# Patient Record
Sex: FEMALE | Employment: OTHER | URBAN - METROPOLITAN AREA
[De-identification: names, ages, dates, MRNs, and addresses within clinical notes are randomized per-mention and may not be internally consistent; named-entity substitution may affect disease eponyms.]

---

## 2020-07-09 ENCOUNTER — OFFICE VISIT (OUTPATIENT)
Dept: NEUROLOGY | Age: 85
End: 2020-07-09
Payer: MEDICARE

## 2020-07-09 VITALS
BODY MASS INDEX: 26.21 KG/M2 | DIASTOLIC BLOOD PRESSURE: 74 MMHG | TEMPERATURE: 97.5 F | SYSTOLIC BLOOD PRESSURE: 196 MMHG | HEIGHT: 59 IN | HEART RATE: 72 BPM | WEIGHT: 130 LBS | OXYGEN SATURATION: 93 %

## 2020-07-09 PROCEDURE — 1123F ACP DISCUSS/DSCN MKR DOCD: CPT | Performed by: PHYSICIAN ASSISTANT

## 2020-07-09 PROCEDURE — 4040F PNEUMOC VAC/ADMIN/RCVD: CPT | Performed by: PHYSICIAN ASSISTANT

## 2020-07-09 PROCEDURE — 4004F PT TOBACCO SCREEN RCVD TLK: CPT | Performed by: PHYSICIAN ASSISTANT

## 2020-07-09 PROCEDURE — G8427 DOCREV CUR MEDS BY ELIG CLIN: HCPCS | Performed by: PHYSICIAN ASSISTANT

## 2020-07-09 PROCEDURE — G8417 CALC BMI ABV UP PARAM F/U: HCPCS | Performed by: PHYSICIAN ASSISTANT

## 2020-07-09 PROCEDURE — 99204 OFFICE O/P NEW MOD 45 MIN: CPT | Performed by: PHYSICIAN ASSISTANT

## 2020-07-09 PROCEDURE — 1090F PRES/ABSN URINE INCON ASSESS: CPT | Performed by: PHYSICIAN ASSISTANT

## 2020-07-09 RX ORDER — ASPIRIN 81 MG/1
TABLET ORAL
COMMUNITY
Start: 2016-08-25

## 2020-07-09 RX ORDER — AMLODIPINE BESYLATE 5 MG/1
TABLET ORAL
COMMUNITY

## 2020-07-09 RX ORDER — MIRTAZAPINE 15 MG/1
15 TABLET, FILM COATED ORAL NIGHTLY
COMMUNITY
End: 2020-07-09 | Stop reason: ALTCHOICE

## 2020-07-09 RX ORDER — BACLOFEN 5 MG/1
10 TABLET ORAL 3 TIMES DAILY PRN
Qty: 90 TABLET | Refills: 0 | Status: SHIPPED
Start: 2020-07-09 | End: 2020-07-21 | Stop reason: SINTOL

## 2020-07-09 RX ORDER — AMLODIPINE BESYLATE 5 MG/1
5 TABLET ORAL DAILY
COMMUNITY
Start: 2018-05-19

## 2020-07-09 RX ORDER — TIZANIDINE 2 MG/1
2 TABLET ORAL 3 TIMES DAILY PRN
COMMUNITY
End: 2021-02-08

## 2020-07-09 RX ORDER — LORAZEPAM 0.5 MG/1
TABLET ORAL
COMMUNITY

## 2020-07-09 RX ORDER — DOXEPIN HYDROCHLORIDE 10 MG/1
10 CAPSULE ORAL NIGHTLY
Qty: 30 CAPSULE | Refills: 2 | Status: SHIPPED
Start: 2020-07-09 | End: 2020-07-21 | Stop reason: SINTOL

## 2020-07-09 RX ORDER — HYDROCHLOROTHIAZIDE 12.5 MG/1
12.5 TABLET ORAL DAILY PRN
COMMUNITY
Start: 2018-03-21

## 2020-07-09 RX ORDER — INSULIN GLARGINE 100 [IU]/ML
INJECTION, SOLUTION SUBCUTANEOUS
COMMUNITY
Start: 2015-08-04

## 2020-07-09 RX ORDER — FLUTICASONE PROPIONATE 50 MCG
SPRAY, SUSPENSION (ML) NASAL
COMMUNITY
Start: 2014-01-13

## 2020-07-09 RX ORDER — INSULIN GLARGINE 100 [IU]/ML
30 INJECTION, SOLUTION SUBCUTANEOUS DAILY
COMMUNITY
Start: 2018-03-22

## 2020-07-09 RX ORDER — GLIPIZIDE 10 MG/1
TABLET, FILM COATED, EXTENDED RELEASE ORAL
COMMUNITY
Start: 2015-08-04

## 2020-07-09 SDOH — HEALTH STABILITY: MENTAL HEALTH: HOW OFTEN DO YOU HAVE A DRINK CONTAINING ALCOHOL?: NEVER

## 2020-07-09 NOTE — PROGRESS NOTES
[address] [facility]. Reyes Torres M.D., F.A.C.P. Wilmer Cortez, DNP, APRN, ACNS-BC  William Sierra. Jordan Simon, MSN, APRN-FNP-C  YAMILKA Carrero, PAZoeyC  Rommel Mcdonald, MSN, APRN-FNP-C  [address], [facility]  [place], [address]  Phone: [phone]  Fax: [phone]       Rudi Neff is a 80 y.o. 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 Allergies:       Patient has no known allergies. Seasonal     Medications:     Prior to Admission medications    Medication Sig Start Date End Date Taking?  Authorizing Provider   amLODIPine (NORVASC) 5 MG tablet    Yes Historical Provider, MD   aspirin (ASPIRIN 81) 81 MG EC tablet  8/25/16  Yes Historical Provider, MD   Al Hyd-Mg Tr-Alg Ac-Sod Bicarb (GAVISCON-2 PO)    Yes Historical Provider, MD   LORazepam (ATIVAN) 0.5 MG tablet    Yes Historical Provider, MD   amLODIPine (NORVASC) 5 MG tablet Take 5 mg by mouth daily 5/19/18  Yes Historical Provider, MD   fluticasone (FLONASE) 50 MCG/ACT nasal spray  1/13/14  Yes Historical Provider, MD   glipiZIDE (GLUCOTROL XL) 10 MG extended release tablet As directed 8/4/15  Yes Historical Provider, MD   hydrochlorothiazide (HYDRODIURIL) 12.5 MG tablet Take 12.5 mg by mouth daily as needed 3/21/18  Yes Historical Provider, MD   insulin glargine (LANTUS SOLOSTAR) 100 UNIT/ML injection pen Inject 30 Units into the skin daily 3/22/18  Yes Historical Provider, MD   insulin glargine (LANTUS) 100 UNIT/ML injection vial  8/4/15  Yes Historical Provider, MD   tiZANidine (ZANAFLEX) 2 MG tablet Take 2 mg by mouth 3 times daily as needed   Yes Historical Provider, MD       Social History:        reports that she has never smoked. She does not have any smokeless tobacco history on file. She reports that she does not drink alcohol or use drugs. She sleeps 8 hours per night-- but wakes up every two hours due to GERD    She is under increased stress due to her headaches and medical concerns     She drinks 1 cup of coffee per day    He does not drink enough water. She only drinks 2 to 3 cups/day. She tries to eat balanced meals. Review of Systems:     No chest pain or palpitations  No SOB  No vertigo, lightheadedness or loss of consciousness  No falls, tripping or stumbling  No incontinence of bowels or bladder  No itching or bruising appreciated  No numbness, tingling or focal arm/leg weakness  + Headache    ROS is otherwise negative    Family History:     Family history significant for diabetes and cancer. There is no family history of headaches. No family history of heart attack or stroke. History of Present Illness: The patient presents for further evaluation and management of headaches. She presents with her son. She is a fair historian. He is a good one. She was seen in the ER 3 weeks ago due to headache. CT scan of the head was unrevealing. The patient's headache problems began 2 years ago after a fall at [facility]. She does not have a history of headaches prior to 2 years ago. She hit her head during the fall. She was evaluated after that and work-up was unrevealing. Since that time she started developing headaches. Over the last 2 years they have gradually worsened in frequency and severity. Currently they are occurring almost daily. They are described as a pressure, tight squeezing pain with occasional photophobia and blurred vision. She denied any sound sensitivity, nausea or vomiting. She takes Tylenol for headaches which does not help at all. She takes this approximately 4 times per week. She cannot take ibuprofen due to her other medical issues.   Her headaches do not wake her up from sleep. Increased stress and poor sleep seem to worsen her headaches whereas laying down and resting improves them. Her headaches began approximately 30 minutes after waking up. They are worse in the afternoon. Severe headaches with an 8 out of 10 severity occur 4-5 times per week and other times she has a dull squeezing ache at all times. She has never been on a preventative medication. She does admit to neck pain that radiates up her head. She was prescribed Zanaflex and only took this on 1 occasion. She does believe it improved her head pains, however due to increased fatigue with this medication she did not take it again. She has a history of depression and takes mirtazapine at night since her daughter passed away. She has also been suffering with sinus pressure and pain and diagnosed with allergic rhinitis. She has an appointment with the [address] [facility] on the 23rd. She recently saw ophthalmology for macular edema secondary to diabetes and retinopathy. Objective:       BP (!) 196/74   Pulse 72   Temp 97.5 °F (36.4 °C)   Ht 4' 11\" (1.499 m)   Wt 130 lb (59 kg)   SpO2 93%   BMI 26.26 kg/m²     General appearance: alert, appears stated age, cooperative and in no distress  Head: normocephalic, without obvious abnormality, atraumatic--bilateral occipital groove tenderness to palpation, no temporal induration or TMJ dysfunction  Eyes: conjunctivae/corneas clear; no drainage  Neck: Limited range of motion   Lungs: clear to auscultation bilaterally  Heart: regular rate and rhythm, S1, S2 normal, no murmur  Extremities: normal, atraumatic, no cyanosis or edema  Pulses: 2+ and symmetric  Skin:  color, texture, turgor normal--no rashes or lesions      Mental Status: alert and oriented.   Thought content appropriate    Appropriate attention/concentration  Intact fundus of knowledge  Repetition intact  Memories intact    Speech: no dysarthria  Language: no aphasias    Cranial Nerves:  I: smell    II: visual acuity     II: visual fields Full to confrontation   II: pupils [place]   III,VII: ptosis None   III,IV,VI: extraocular muscles  EOMI without nystagmus   V: mastication Normal   V: facial light touch sensation  Normal   V,VII: corneal reflex     VII: facial muscle function - upper  Normal   VII: facial muscle function - lower Normal   VIII: hearing Normal   IX: soft palate elevation  Normal   IX,X: gag reflex    XI: trapezius strength  5/5   XI: sternocleidomastoid strength 5/5   XI: neck extension strength  5/5   XII: tongue strength  Normal     Motor:  5/5 throughout  Normal bulk and tone  No drift   No abnormal movements    Sensory:  LT  normal  Vibration markedly decreased at the ankles bilaterally    Coordination:   FN, FFM and BILLY normal  HS normal    Gait:  Normal    DTR:   +2 throughout    No Babinskis  No Mohan's    Laboratory/Radiology:  ry/Radiology:     Recent labs unremarkable for decreased hemoglobin and hematocrit  CMP reveals elevated creatinine at 1.37 and GFR 35    CT head report reviewed from [facility]-unrevealing    All labs and images were personally reviewed at the time of this visit    Assessment:     Chronic TTH -- likely precipitated by fall and minor head injury two years ago    No relief with Tylenol. She is unable to take NSAIDs. Worsened with increased stress levels and poor sleep   Has never been on prophylactic medication. Due to her age amitriptyline is not a good option due to the anticholinergic side effects. Inderal is also unable to be trialed due to her diabetes. Topamax is not a good option due to her kidney disease. We will try doxepin 10 mg at night. She is on mirtazapine 7.5 mg nightly for her depression. We will discontinue this medication and replace it with doxepin as this will likely have effects for both her depression and headache prevention. She previously responded to Zanaflex however due to sedation she did not continue this.   We will try baclofen 5 mg 3 times daily as needed. Hopefully this will be less sedating than Zanaflex. Occipital neuralgia bilateral    Referral to Dr. Melida Lion for occipital nerve block     HTN--patient's blood pressure was 789 systolic, 408 systolic on other arm and 203/84 on repeat at the end of exam.  I advised the patient that she either needs to call her PCP or go to the ER to have this lowered as she is at increased risk for heart attack and stroke with sustained blood pressure this high. She and her son expressed understanding. Plan:     Doxepin 10 mg at bedtime for preventative therapy    Discontinue mirtazapine    Discontinue Zanaflex    Start baclofen 5 mg 3 times daily as needed    Referral Dr. Melida Lion for possible occipital nerve blocks    We will check ESR and CRP due to patient's age and new headaches    Improved sleep hygiene and stress reduction techniques encouraged    Return to office in 3 months    Call with any questions or concerns    Nicole Ward PA-C  3:15 PM  7/9/2020    I spent 55 minutes with this patient obtaining the HPI and discussing the exam with greater than 50% of the time providing counseling and education on medications and other treatment plans. All questions were answered prior to leaving my office.

## 2020-07-09 NOTE — PATIENT INSTRUCTIONS
Doxepin 10 mg at bedtime (headache prevention) -- discontinue the mirtazepine the day prior to starting Doxepin     Baclofen (muscle relaxer) 5 mg three times daily as needed (to help with the headache pain and tension)     Do not start both of the new medicines on the same day-- wait a couple days in between

## 2020-07-20 ENCOUNTER — TELEPHONE (OUTPATIENT)
Dept: NEUROLOGY | Age: 85
End: 2020-07-20

## 2020-07-20 NOTE — TELEPHONE ENCOUNTER
Patient called in stating that the doxepin made her very sick, dry mouth & sick to stomach and the Baclofen made her feel \"funny\". She would like to speak with you.   Please Advise  Electronically signed by Kunal Abdul on 7/20/20 at 11:30 AM EDT

## 2020-07-21 NOTE — TELEPHONE ENCOUNTER
Called and spoke to patient. I advised her to discontinue both the doxepin and baclofen. She can resume taking Remeron (she was previously on this to help her sleep and for depression). She has an appointment with her allergist on Thursday. I advised her to keep this appointment as well as follow up with Dr. Kelley Screen for possible occipital nerve blocks. Unfortunately,due to her co-morbid conditions-- treatment options are limited for her headache  She is unable to take NSAIDs due to stomach ulcer reported by patient   Tylenol was ineffective. Zanaflex and baclofen were trailed-- but side effects outweighed benefits     In regards to prophylactic therapy-- Topamax is not a good option due to hx of renal calculi, Elavil unable due to SE, Doxepin also had side effects. Inderal is not a good option due to her diabetes. Potentially gabapentin could be trialed, however she seems to be very sensitive to medication side effects. She will do the above first and then call back if she still is having issues.      All questions answered at this time

## 2020-08-24 ENCOUNTER — TELEPHONE (OUTPATIENT)
Dept: NEUROLOGY | Age: 85
End: 2020-08-24

## 2020-08-24 NOTE — TELEPHONE ENCOUNTER
Patient called in requesting call from SABA Roque states having extreme pressure behind eyes, top of head has been going on for awhile but today is really bad. MA did advise if gets worse to go to ED.   Electronically signed by Tiffani Bone on 8/24/20 at 10:15 AM EDT

## 2020-08-24 NOTE — TELEPHONE ENCOUNTER
Patient's BP is 177/83 again MA instructed patient to ED per [facility] advisement.  She said ok and hung up

## 2020-08-24 NOTE — TELEPHONE ENCOUNTER
Called patient instructed her to ED, she states that her BP has been high before  Will retake and call back

## 2020-08-24 NOTE — TELEPHONE ENCOUNTER
Please instruct the patient to go to the ED-- her elevated BP may be causing her symptoms. Thank you.

## 2020-08-24 NOTE — TELEPHONE ENCOUNTER
I called and spoke to patient--Her BP at the office at last visit was > 637 systolic. I instructed her to take her BP at home and call back with the results due to her increased headache, eye pain and pressure. If SBP > 180-- please advise patient to go to ER for further management     If BP is below, I will call patient back with further recommendations for treatment. Thank you.

## 2020-08-27 ENCOUNTER — TELEPHONE (OUTPATIENT)
Dept: NEUROLOGY | Age: 85
End: 2020-08-27

## 2020-08-27 NOTE — TELEPHONE ENCOUNTER
Called and spoke with patient. She did not go to the ED as instructed on Monday when she called. She did f/u with her PCP and he added HCTZ for her BP. She has an appointment with Dr. Sandra Cohen on 9/10 for possible occipital nerve blocks. Again-- with her age and medical co-morbidities, treatment options are limited for her headaches.      Preventatives   Amitriptyline- not tried due to age and anticholinergic side effects   Doxepin- Tried and SE not tolerated   Topamax- not tried due to hx of kidney stones   Inderal- not tried due to hx of diabetes-- can mask signs of hypoglycemia     Abortive   Unable to take NSAIDs per patient  Triptans unable due to hx of CAD  Has tried Zanaflex and Baclofen, but had intolerable side effects  Unable to do steroid burst due to diabetes      Will send over verapamil 120 mg SR daily for preventative therapy  Consider trying CGRP abortive medication   Encouraged regular home BP checks   Encouraged her to keep f/u with Dr. Sandra Cohen on 9/10     She will call at any time if problems arise

## 2020-08-27 NOTE — TELEPHONE ENCOUNTER
Patient called in requesting a call back from Giles De La Garza about her headaches. Informed her that Kelli Ibarra is working in the hospital this week and not in the office. She stated that was ok, that Kelli Ibarra still should call her. She would not give me anymore information and requested again the Kelli Gomezr call her.

## 2020-11-23 ENCOUNTER — TELEPHONE (OUTPATIENT)
Dept: NEUROLOGY | Age: 85
End: 2020-11-23

## 2020-11-23 NOTE — TELEPHONE ENCOUNTER
Patient called in today. She canceled her appt on 10/29/20. She states that she is in pain. MA gave he an appt for 12/16/20. She was advised if gets worse that she should go to ER Dept or call PCP. Please advise.   Electronically signed by Elyn Seip, MA on 11/23/20 at 2:48 PM EST

## 2020-11-30 NOTE — TELEPHONE ENCOUNTER
Patient did not go to Dr Amita Cevallos. She states that she will wait til her appt 12/16/20 with Danny Merritt. .  Electronically signed by Tk Mueller MA on 11/30/20 at 11:01 AM EST

## 2020-11-30 NOTE — TELEPHONE ENCOUNTER
Did the patient ever go for her appointment with Dr. Akosua Goldberg for occipital nerve blocks that was scheduled in September? I can attempt to send something over for her-- but she has been very sensitive to side effects of every medication I have tried so far.

## 2020-12-16 ENCOUNTER — VIRTUAL VISIT (OUTPATIENT)
Dept: NEUROLOGY | Age: 85
End: 2020-12-16
Payer: MEDICARE

## 2020-12-16 PROCEDURE — G2012 BRIEF CHECK IN BY MD/QHP: HCPCS | Performed by: PHYSICIAN ASSISTANT

## 2020-12-16 NOTE — PROGRESS NOTES
[address] [facility]. Adryan Renae M.D., F.A.C.P. Abigail Butts, DNP, APRN, CNS  Dali Khan. Yen Stewart, MSN, APRN-FNP-C  Enoch Cartagena MSN, APRN, FNP-C  Dolly PRATHER, PA-SANDER  [address] MSN, APRN, FNP-C  [address], [facility]  [place], [address] [address] [place]  Phone: [phone]  Fax: [phone]             Odessa Red is a 80 y.o. female evaluated via telephone on 12/16/2020. The patient and/or health care decision maker is aware that that he/she may receive a bill for this telephone service, depending on his/her insurance coverage, and has provided verbal consent to proceed: Yes    I affirm this is a Patient Initiated Episode with an Established Patient who has not had a related appointment within my department in the past 7 days or scheduled within the next 24 hours. The patient's identity was verified at the start of the call. Others involved in call: Total Time: minutes: 11-20 minutes    Note: not billable if this call serves to triage the patient into an appointment for the relevant concern    HPI:     Patient follows for headaches. Patient initially seen in July 2020 and had reported she has been suffering with headaches for 2 years after she had suffered a fall and head injury. They have gradually worsened in frequency and severity. They are occurring almost daily  She describes as a heaviness and pressure sensation behind the eyes and across the forehead. Various prophylactic medications have been trialed but discontinued after couple days of use due to side effects. Additionally due to her age and intolerance to medications several were unable to be trialed as well. On her previous exam it was noted that she was tender over the bilateral occipital groove suggesting bilateral occipital neuralgia. She was referred for occipital nerve blocks with Dr. Jw Keating in September however did not go to this appointment. She also reports having fibromyalgia and was started on Lyrica by her PCP. She is unable to take this medication twice daily as prescribed due to the side effects. Once a day is not helping with her pains. She admits to significant depression due to the above. No chest pain or palpitations  No SOB  No vertigo, lightheadedness or loss of consciousness  No falls, tripping or stumbling  No incontinence of bowels or bladder  No itching or bruising   No numbness, tingling or focal arm/leg weakness  + Headache    ROS otherwise negative      Medications:     Prior to Admission medications    Medication Sig Start Date End Date Taking?  Authorizing Provider   verapamil (CALAN SR) 120 MG extended release tablet Take 1 tablet by mouth daily 8/27/20  Yes SABA Packer   amLODIPine (NORVASC) 5 MG tablet    Yes Historical Provider, MD   aspirin (ASPIRIN 81) 81 MG EC tablet  8/25/16  Yes Historical Provider, MD   Al Hyd-Mg Tr-Alg Ac-Sod Bicarb (GAVISCON-2 PO)    Yes Historical Provider, MD   LORazepam (ATIVAN) 0.5 MG tablet    Yes Historical Provider, MD   amLODIPine (NORVASC) 5 MG tablet Take 5 mg by mouth daily 5/19/18  Yes Historical Provider, MD   fluticasone (FLONASE) 50 MCG/ACT nasal spray  1/13/14  Yes Historical Provider, MD   glipiZIDE (GLUCOTROL XL) 10 MG extended release tablet As directed 8/4/15  Yes Historical Provider, MD   hydrochlorothiazide (HYDRODIURIL) 12.5 MG tablet Take 12.5 mg by mouth daily as needed 3/21/18  Yes Historical Provider, MD   insulin glargine (LANTUS SOLOSTAR) 100 UNIT/ML injection pen Inject 30 Units into the skin daily 3/22/18  Yes Historical Provider, MD   insulin glargine (LANTUS) 100 UNIT/ML injection vial  8/4/15  Yes Historical Provider, MD   tiZANidine (ZANAFLEX) 2 MG tablet Take 2 mg by mouth 3 times daily as needed    Historical Provider, MD       Objective:     Mental Status Exam: sounds alert, oriented x4; thought processes appropriate    Speech sounds clear Breathing Effort sounds normal    Laboratory/Radiology: There are no current labs or images for me to review at today's visit     All labs and images personally reviewed today    Assessment:     Chronic TTH -- likely precipitated by fall and minor head injury two years ago               No relief with Tylenol. She is unable to take NSAIDs. No Triptans due to CAD              Worsened with increased stress levels and poor sleep              Due to her age amitriptyline is not a good option due to the anticholinergic side effects. Inderal is also unable to be trialed due to her diabetes as this can mask signs of hypoglycemia. Topamax is not a good option due to her kidney disease. Also failed Doxepin and verapamil due to side effects.                Additionally, she has tried and failed Zanaflex and baclofen due to side effects    Unable to do steroid burst due to her diabetes   Unfortunately, she is very sensitive to medication side effects and has been unable to tolerate the above list.    She may benefit from seeing a headache specialist                 Occipital neuralgia bilateral               Referral to Dr. Jw Keating for occipital nerve block       Plan:     Referral back to Dr. Jw Keating for possible occipital nerve blocks     Consider referral to [facility] headache clinic     She has not completed blood work I had ordered including ESR and CRP     RTO after above     Call with questions or concerns       Myah Ordaz PA-C  12:45 PM  12/16/2020

## 2020-12-16 NOTE — PROGRESS NOTES
Arnol Graham was read the following message We want to confirm that, for purposes of billing, this is a virtual visit with your provider for which we will submit a claim for reimbursement with your insurance company. You will be responsible for any copays, coinsurance amounts or other amounts not covered by your insurance company. If you do not accept this, unfortunately we will not be able to schedule or proceed with a virtual visit with the provider. Do you accept? Chloe Beltran responded Yes .

## 2021-02-08 ENCOUNTER — OFFICE VISIT (OUTPATIENT)
Dept: NEUROLOGY | Age: 86
End: 2021-02-08
Payer: MEDICARE

## 2021-02-08 VITALS
TEMPERATURE: 98.3 F | HEART RATE: 69 BPM | RESPIRATION RATE: 16 BRPM | DIASTOLIC BLOOD PRESSURE: 92 MMHG | SYSTOLIC BLOOD PRESSURE: 202 MMHG | WEIGHT: 130 LBS | OXYGEN SATURATION: 97 % | HEIGHT: 57 IN | BODY MASS INDEX: 28.05 KG/M2

## 2021-02-08 DIAGNOSIS — R51.9 CHRONIC DAILY HEADACHE: Primary | ICD-10-CM

## 2021-02-08 DIAGNOSIS — M54.2 CERVICALGIA: ICD-10-CM

## 2021-02-08 PROCEDURE — 1123F ACP DISCUSS/DSCN MKR DOCD: CPT | Performed by: PSYCHIATRY & NEUROLOGY

## 2021-02-08 PROCEDURE — G8417 CALC BMI ABV UP PARAM F/U: HCPCS | Performed by: PSYCHIATRY & NEUROLOGY

## 2021-02-08 PROCEDURE — 1090F PRES/ABSN URINE INCON ASSESS: CPT | Performed by: PSYCHIATRY & NEUROLOGY

## 2021-02-08 PROCEDURE — G8427 DOCREV CUR MEDS BY ELIG CLIN: HCPCS | Performed by: PSYCHIATRY & NEUROLOGY

## 2021-02-08 PROCEDURE — 4040F PNEUMOC VAC/ADMIN/RCVD: CPT | Performed by: PSYCHIATRY & NEUROLOGY

## 2021-02-08 PROCEDURE — 1036F TOBACCO NON-USER: CPT | Performed by: PSYCHIATRY & NEUROLOGY

## 2021-02-08 PROCEDURE — 99204 OFFICE O/P NEW MOD 45 MIN: CPT | Performed by: PSYCHIATRY & NEUROLOGY

## 2021-02-08 PROCEDURE — G8484 FLU IMMUNIZE NO ADMIN: HCPCS | Performed by: PSYCHIATRY & NEUROLOGY

## 2021-02-08 RX ORDER — ACETAMINOPHEN, ASPIRIN AND CAFFEINE 250; 250; 65 MG/1; MG/1; MG/1
1 TABLET, FILM COATED ORAL EVERY 6 HOURS PRN
COMMUNITY

## 2021-02-08 RX ORDER — TRAMADOL HYDROCHLORIDE 50 MG/1
50 TABLET ORAL EVERY 6 HOURS PRN
COMMUNITY

## 2021-02-08 RX ORDER — NORTRIPTYLINE HYDROCHLORIDE 10 MG/1
10 CAPSULE ORAL NIGHTLY
Qty: 30 CAPSULE | Refills: 0 | Status: SHIPPED | OUTPATIENT
Start: 2021-02-08

## 2021-02-08 RX ORDER — PREGABALIN 25 MG/1
25 CAPSULE ORAL 2 TIMES DAILY
COMMUNITY

## 2021-02-08 ASSESSMENT — ENCOUNTER SYMPTOMS
VOMITING: 0
NAUSEA: 0
TROUBLE SWALLOWING: 0
PHOTOPHOBIA: 0
SHORTNESS OF BREATH: 0

## 2021-02-08 NOTE — PROGRESS NOTES
NEUROLOGY NEW PATIENT NOTE     Date: 2/8/2021  Name: Diamante Larsen  MRN: <[id number]>  Patient's PCP: Kristi Montana DO     Dear, Dr. Kristi Montana DO    REASON FOR VISIT/CHIEF COMPLAINT: Headache     HISTORY OF PRESENT ILLNESS: Diamante Larsen is a 80 y.o.  female is coming for evaluation of headache    I have personally reviewed her medical records      PAST MEDICAL HISTORY:   Past Medical History:   Diagnosis Date    Diabetes ([address]     Hypertension      PAST SURGICAL HISTORY:   Past Surgical History:   Procedure Laterality Date    HYSTERECTOMY       FAMILY MEDICAL HISTORY: History reviewed. No pertinent family history. SOCIAL HISTORY:   Social History     Socioeconomic History    Marital status:       Spouse name: None    Number of children: None    Years of education: None    Highest education level: None   Occupational History    None   Social Needs    Financial resource strain: None    Food insecurity     Worry: None     Inability: None    Transportation needs     Medical: None     Non-medical: None   Tobacco Use    Smoking status: Never Smoker    Smokeless tobacco: Never Used   Substance and Sexual Activity    Alcohol use: Never     Frequency: Never    Drug use: Never    Sexual activity: None   Lifestyle    Physical activity     Days per week: None     Minutes per session: None    Stress: None   Relationships    Social connections     Talks on phone: None     Gets together: None     Attends Anglican service: None     Active member of club or organization: None     Attends meetings of clubs or organizations: None     Relationship status: None    Intimate partner violence     Fear of current or ex partner: None     Emotionally abused: None     Physically abused: None     Forced sexual activity: None   Other Topics Concern    None   Social History Narrative    None      E-Cigarettes/Vaping Use     Questions Responses    E-Cigarette/Vaping Use Never User    Start Date Passive Exposure     Quit Date     Counseling Given     Comments          Allergy: No Known Allergies  MEDS:   Current Outpatient Medications:     insulin lispro (HUMALOG) 100 UNIT/ML injection vial, Inject into the skin 3 times daily (before meals), Disp: , Rfl:     pregabalin (LYRICA) 25 MG capsule, Take 25 mg by mouth 2 times daily. , Disp: , Rfl:     aspirin-acetaminophen-caffeine (EXCEDRIN MIGRAINE) 250-250-65 MG per tablet, Take 1 tablet by mouth every 6 hours as needed for Headaches, Disp: , Rfl:     traMADol (ULTRAM) 50 MG tablet, Take 50 mg by mouth every 6 hours as needed for Pain., Disp: , Rfl:     nortriptyline (PAMELOR) 10 MG capsule, Take 1 capsule by mouth nightly, Disp: 30 capsule, Rfl: 0    amLODIPine (NORVASC) 5 MG tablet, , Disp: , Rfl:     aspirin (ASPIRIN 81) 81 MG EC tablet, , Disp: , Rfl:     Al Hyd-Mg Tr-Alg Ac-Sod Bicarb (GAVISCON-2 PO), , Disp: , Rfl:     LORazepam (ATIVAN) 0.5 MG tablet, , Disp: , Rfl:     amLODIPine (NORVASC) 5 MG tablet, Take 5 mg by mouth daily, Disp: , Rfl:     fluticasone (FLONASE) 50 MCG/ACT nasal spray, , Disp: , Rfl:     glipiZIDE (GLUCOTROL XL) 10 MG extended release tablet, As directed, Disp: , Rfl:     hydrochlorothiazide (HYDRODIURIL) 12.5 MG tablet, Take 12.5 mg by mouth daily as needed, Disp: , Rfl:     insulin glargine (LANTUS SOLOSTAR) 100 UNIT/ML injection pen, Inject 30 Units into the skin daily, Disp: , Rfl:     insulin glargine (LANTUS) 100 UNIT/ML injection vial, , Disp: , Rfl:     REVIEW OF SYSTEMS  Review of Systems   Constitutional: Negative for appetite change, fatigue and unexpected weight change. HENT: Negative for drooling, hearing loss, tinnitus and trouble swallowing. Eyes: Negative for photophobia and visual disturbance. Respiratory: Negative for shortness of breath. Cardiovascular: Negative for palpitations. Gastrointestinal: Negative for nausea and vomiting. Endocrine: Negative for polyuria.    Genitourinary: Negative for flank pain. Musculoskeletal: Negative for neck pain and neck stiffness. Skin: Negative for rash. Allergic/Immunologic: Negative for food allergies. Neurological: Positive for headaches. Negative for dizziness, tremors, seizures, syncope, speech difficulty, weakness, light-headedness and numbness. Hematological: Negative for adenopathy. Psychiatric/Behavioral: Negative for agitation, behavioral problems and sleep disturbance. PHYSICAL EXAM:   BP (!) 202/92 Comment: dr Watson Bal notified  Pulse 69   Temp 98.3 °F (36.8 °C) (Temporal)   Resp 16   Ht 4' 9\" (1.448 m)   Wt 130 lb (59 kg)   SpO2 97%   BMI 28.13 kg/m²      Neurological examination     MENTAL STATUS: Patient awake and oriented to time, place, and person. Recent/remote memory normal. Attention span/concentration normal. Speech fluent. Good comprehension, naming, and repetition. Fund of knowledge appropriate for patient's level of education. Affect normal.    CRANIAL NERVES:  CN I: Not tested. CN II: Fundoscopic exam not performed. CN III, IV, VI: Pupils equal, round and reactive to light; extra ocular movements full and intact. CN V: Facial sensation normal.  CN VII: No facial asymmetry. CN VIII:  Hearing grossly normal bilaterally. No pathologic nystagmus or skew deviation. CN IX, X: Palate elevates symmetrically. CN XI: Shoulder shrug and chin rotation equal with intact strength. CN XII: Tongue protrusion midline. MOTOR: Normal bulk. Tone normal and symmetric throughout. Strength 5/5 throughout. ABNORMAL MOVEMENTS/TREMORS: No     REFLEXES: DTRs 2+; normal and symmetric throughout. Plantar response downgoing. SENSATION: Sensation grossly intact to fine touch, pain/temperature, vibration and position. COORDINATION: Finger-to-nose and heel to shin normal for age and symmetric. Finger tapping and alternating movements normal.    STATION: Negative Romberg. GAIT:  Normal heel, toe and tandem; no ataxia. Tenderness to palpation bilateral occipital region    Multiple trigger points in the neck and back    DIAGNOSTIC TESTS:     I have personally reviewed the most recent lab results:    No results found for: NA, K, CL, CO2, BUN, CREATININE, LABGLOM, CALCIUM, PHOS, MG  No results found for: WBC, HGB, HCT, PLT, NEUTOPHILPCT, MONOPCT, EOSPCT  No results found for: ALBUMIN, PROT, BILITOT, ALKPHOS, ALT, AST  No results found for: PTT, INR  No results found for: CHOLTOT, TRIG, HDL  No components found for: HGBA1C  No results found for: PROTEINCSF, GLUCCSF, WBCCSF    Controlled Substance Monitoring:    Acute and Chronic Pain Monitoring:   No flowsheet data found. MEDICAL DECISION MAKING  ASSESSMENT/PLAN    [facility] was seen today for headache. Diagnoses and all orders for this visit:    Posttraumatic headache    Chronic daily headache    Occipital pain    Cervicalgia    Cervical myofascial pain    Fibromyalgia    -     nortriptyline (PAMELOR) 10 MG capsule; Take 1 capsule by mouth nightly  -     External Referral To Physical Therapy    · The patient is coming in for evaluation of headache. The patient is accompanied by her son who provides the history. · She had a fall about 2 years ago and since then she has been experiencing headache. She does not have any history of migraines. The patient reports that her headache started in the bilateral frontal region and then radiates all over the head. She also has worsening of her headache when she moves her head. She also has bilateral occipital pain which gets worse on palpation. · She has tried multiple medications in the past including doxepin, verapamil, doxepin give her side effects and she did not try verapamil. She also tried Zanaflex which helped her symptoms however it caused her to be sleepy and drowsy. · She has not tried beta-blockers due to risk of asthma,, diabetes she has not tried any other tricyclic antidepressants.   · At this time her symptoms are most likely secondary to underlying posttraumatic headache with combined features of tension type and migraine headaches and her underlying myofascial pain with the fibromyalgia is also playing a role in it. .  She has bilateral occipital tenderness to palpation and multiple trigger points in the neck. · At this time I have discussed the possibility of starting low-dose nortriptyline for posttraumatic headache. The patient was counseled on risk and benefits of the medication and potential side effects and the black box warning with the medication. They verbalized understanding. · A trial of occipital nerve blocks and trigger point injections can be done for symptomatic relief. Risk and benefits explained. Currently the patient would like to hold off on it. Follow-up with Chrissie Sicard    Thank you for involving me in the care of your patient. I have personally spent a total time of 47 mins. This includes face-to-face and nonface-to-face time in reviewing patient's chart, outside provider notes, imaging and  test results, discussing etiology management and diagnosis of the patient's condition, natural course of the disease, medication side effects and charting, ordering labs, imaging, medications, and coordination of care. Patient's current medication list, allergies, problem list and results of all previously ordered testing and scans were reviewed at today's visit.       Marilyn Loya MD    [facility] Neurology  [address] [place] [address]

## 2023-08-28 ENCOUNTER — APPOINTMENT (OUTPATIENT)
Dept: GENERAL RADIOLOGY | Age: 88
DRG: 064 | End: 2023-08-28
Payer: MEDICARE

## 2023-08-28 ENCOUNTER — HOSPITAL ENCOUNTER (INPATIENT)
Age: 88
LOS: 4 days | Discharge: NURSING, REHAB OR LONG-TERM CARE | DRG: 064 | End: 2023-09-02
Attending: STUDENT IN AN ORGANIZED HEALTH CARE EDUCATION/TRAINING PROGRAM | Admitting: INTERNAL MEDICINE
Payer: MEDICARE

## 2023-08-28 ENCOUNTER — APPOINTMENT (OUTPATIENT)
Dept: CT IMAGING | Age: 88
DRG: 064 | End: 2023-08-28
Payer: MEDICARE

## 2023-08-28 DIAGNOSIS — K57.32 DIVERTICULITIS OF COLON: ICD-10-CM

## 2023-08-28 DIAGNOSIS — G62.9 NEUROPATHY: ICD-10-CM

## 2023-08-28 DIAGNOSIS — I63.322 CEREBROVASCULAR ACCIDENT (CVA) DUE TO THROMBOSIS OF LEFT ANTERIOR CEREBRAL ARTERY (HCC): ICD-10-CM

## 2023-08-28 DIAGNOSIS — R41.82 ALTERED MENTAL STATUS, UNSPECIFIED ALTERED MENTAL STATUS TYPE: Primary | ICD-10-CM

## 2023-08-28 LAB
ALBUMIN SERPL-MCNC: 3.9 G/DL (ref 3.5–5.2)
ALP SERPL-CCNC: 93 U/L (ref 35–104)
ALT SERPL-CCNC: 24 U/L (ref 0–32)
AMMONIA PLAS-SCNC: 33 UMOL/L (ref 11–51)
AMPHET UR QL SCN: NEGATIVE
ANION GAP SERPL CALCULATED.3IONS-SCNC: 13 MMOL/L (ref 7–16)
APAP SERPL-MCNC: <5 UG/ML (ref 10–30)
AST SERPL-CCNC: 27 U/L (ref 0–31)
BACTERIA URNS QL MICRO: ABNORMAL
BARBITURATES UR QL SCN: NEGATIVE
BASOPHILS # BLD: 0 K/UL (ref 0–0.2)
BASOPHILS NFR BLD: 0 % (ref 0–2)
BENZODIAZ UR QL: NEGATIVE
BILIRUB DIRECT SERPL-MCNC: 0.2 MG/DL (ref 0–0.3)
BILIRUB INDIRECT SERPL-MCNC: 0.9 MG/DL (ref 0–1)
BILIRUB SERPL-MCNC: 1.1 MG/DL (ref 0–1.2)
BILIRUB UR QL STRIP: NEGATIVE
BUN SERPL-MCNC: 24 MG/DL (ref 6–23)
BUPRENORPHINE UR QL: NEGATIVE
CALCIUM SERPL-MCNC: 9 MG/DL (ref 8.6–10.2)
CANNABINOIDS UR QL SCN: NEGATIVE
CHLORIDE SERPL-SCNC: 103 MMOL/L (ref 98–107)
CHP ED QC CHECK: NORMAL
CK SERPL-CCNC: 104 U/L (ref 20–180)
CLARITY UR: CLEAR
CO2 SERPL-SCNC: 20 MMOL/L (ref 22–29)
COCAINE UR QL SCN: NEGATIVE
COLOR UR: YELLOW
CREAT SERPL-MCNC: 1.1 MG/DL (ref 0.5–1)
EOSINOPHIL # BLD: 0.09 K/UL (ref 0.05–0.5)
EOSINOPHILS RELATIVE PERCENT: 1 % (ref 0–6)
ERYTHROCYTE [DISTWIDTH] IN BLOOD BY AUTOMATED COUNT: 14.2 % (ref 11.5–15)
ETHANOLAMINE SERPL-MCNC: <10 MG/DL
FENTANYL UR QL: NEGATIVE
GFR SERPL CREATININE-BSD FRML MDRD: 49 ML/MIN/1.73M2
GLUCOSE BLD-MCNC: 227 MG/DL
GLUCOSE BLD-MCNC: 227 MG/DL (ref 74–99)
GLUCOSE SERPL-MCNC: 250 MG/DL (ref 74–99)
GLUCOSE UR STRIP-MCNC: 250 MG/DL
HCT VFR BLD AUTO: 35.3 % (ref 34–48)
HGB BLD-MCNC: 11.2 G/DL (ref 11.5–15.5)
HGB UR QL STRIP.AUTO: ABNORMAL
INR PPP: 1.1
KETONES UR STRIP-MCNC: NEGATIVE MG/DL
LACTATE BLDV-SCNC: 1.6 MMOL/L (ref 0.5–1.9)
LEUKOCYTE ESTERASE UR QL STRIP: NEGATIVE
LIPASE SERPL-CCNC: 8 U/L (ref 13–60)
LYMPHOCYTES NFR BLD: 0.43 K/UL (ref 1.5–4)
LYMPHOCYTES RELATIVE PERCENT: 5 % (ref 20–42)
MCH RBC QN AUTO: 32.8 PG (ref 26–35)
MCHC RBC AUTO-ENTMCNC: 31.7 G/DL (ref 32–34.5)
MCV RBC AUTO: 103.5 FL (ref 80–99.9)
METHADONE UR QL: NEGATIVE
MONOCYTES NFR BLD: 0.51 K/UL (ref 0.1–0.95)
MONOCYTES NFR BLD: 6 % (ref 2–12)
NEUTROPHILS NFR BLD: 88 % (ref 43–80)
NEUTS SEG NFR BLD: 7.58 K/UL (ref 1.8–7.3)
NITRITE UR QL STRIP: NEGATIVE
OPIATES UR QL SCN: NEGATIVE
OXYCODONE UR QL SCN: NEGATIVE
PCP UR QL SCN: NEGATIVE
PH UR STRIP: 5.5 [PH] (ref 5–9)
PLATELET # BLD AUTO: 196 K/UL (ref 130–450)
PMV BLD AUTO: 10.4 FL (ref 7–12)
POTASSIUM SERPL-SCNC: 4.2 MMOL/L (ref 3.5–5)
PROT SERPL-MCNC: 7.3 G/DL (ref 6.4–8.3)
PROT UR STRIP-MCNC: 100 MG/DL
PROTHROMBIN TIME: 11.6 SEC (ref 9.3–12.4)
RBC # BLD AUTO: 3.41 M/UL (ref 3.5–5.5)
RBC # BLD: ABNORMAL 10*6/UL
RBC #/AREA URNS HPF: ABNORMAL /HPF
SALICYLATES SERPL-MCNC: <0.3 MG/DL (ref 0–30)
SARS-COV-2 RDRP RESP QL NAA+PROBE: NOT DETECTED
SODIUM SERPL-SCNC: 136 MMOL/L (ref 132–146)
SP GR UR STRIP: 1.02 (ref 1–1.03)
SPECIMEN DESCRIPTION: NORMAL
TEST INFORMATION: NORMAL
TOXIC TRICYCLIC SC,BLOOD: NEGATIVE
TROPONIN I SERPL HS-MCNC: 34 NG/L (ref 0–9)
TROPONIN I SERPL HS-MCNC: 36 NG/L (ref 0–9)
TSH SERPL DL<=0.05 MIU/L-ACNC: 2.58 UIU/ML (ref 0.27–4.2)
UROBILINOGEN UR STRIP-ACNC: 0.2 EU/DL (ref 0–1)
WBC #/AREA URNS HPF: ABNORMAL /HPF
WBC OTHER # BLD: 8.6 K/UL (ref 4.5–11.5)

## 2023-08-28 PROCEDURE — 6360000004 HC RX CONTRAST MEDICATION: Performed by: RADIOLOGY

## 2023-08-28 PROCEDURE — 96374 THER/PROPH/DIAG INJ IV PUSH: CPT

## 2023-08-28 PROCEDURE — 81001 URINALYSIS AUTO W/SCOPE: CPT

## 2023-08-28 PROCEDURE — 74177 CT ABD & PELVIS W/CONTRAST: CPT

## 2023-08-28 PROCEDURE — 71045 X-RAY EXAM CHEST 1 VIEW: CPT

## 2023-08-28 PROCEDURE — 80307 DRUG TEST PRSMV CHEM ANLYZR: CPT

## 2023-08-28 PROCEDURE — 2580000003 HC RX 258: Performed by: STUDENT IN AN ORGANIZED HEALTH CARE EDUCATION/TRAINING PROGRAM

## 2023-08-28 PROCEDURE — 84443 ASSAY THYROID STIM HORMONE: CPT

## 2023-08-28 PROCEDURE — 82248 BILIRUBIN DIRECT: CPT

## 2023-08-28 PROCEDURE — G0378 HOSPITAL OBSERVATION PER HR: HCPCS

## 2023-08-28 PROCEDURE — 82140 ASSAY OF AMMONIA: CPT

## 2023-08-28 PROCEDURE — 87077 CULTURE AEROBIC IDENTIFY: CPT

## 2023-08-28 PROCEDURE — 96375 TX/PRO/DX INJ NEW DRUG ADDON: CPT

## 2023-08-28 PROCEDURE — 99285 EMERGENCY DEPT VISIT HI MDM: CPT

## 2023-08-28 PROCEDURE — 6360000002 HC RX W HCPCS: Performed by: STUDENT IN AN ORGANIZED HEALTH CARE EDUCATION/TRAINING PROGRAM

## 2023-08-28 PROCEDURE — 80143 DRUG ASSAY ACETAMINOPHEN: CPT

## 2023-08-28 PROCEDURE — 96361 HYDRATE IV INFUSION ADD-ON: CPT

## 2023-08-28 PROCEDURE — 93005 ELECTROCARDIOGRAM TRACING: CPT | Performed by: STUDENT IN AN ORGANIZED HEALTH CARE EDUCATION/TRAINING PROGRAM

## 2023-08-28 PROCEDURE — 87040 BLOOD CULTURE FOR BACTERIA: CPT

## 2023-08-28 PROCEDURE — 82550 ASSAY OF CK (CPK): CPT

## 2023-08-28 PROCEDURE — 85025 COMPLETE CBC W/AUTO DIFF WBC: CPT

## 2023-08-28 PROCEDURE — 83690 ASSAY OF LIPASE: CPT

## 2023-08-28 PROCEDURE — 84484 ASSAY OF TROPONIN QUANT: CPT

## 2023-08-28 PROCEDURE — 87635 SARS-COV-2 COVID-19 AMP PRB: CPT

## 2023-08-28 PROCEDURE — 82962 GLUCOSE BLOOD TEST: CPT

## 2023-08-28 PROCEDURE — 70450 CT HEAD/BRAIN W/O DYE: CPT

## 2023-08-28 PROCEDURE — 83605 ASSAY OF LACTIC ACID: CPT

## 2023-08-28 PROCEDURE — 85610 PROTHROMBIN TIME: CPT

## 2023-08-28 PROCEDURE — G0480 DRUG TEST DEF 1-7 CLASSES: HCPCS

## 2023-08-28 PROCEDURE — 80179 DRUG ASSAY SALICYLATE: CPT

## 2023-08-28 PROCEDURE — 80053 COMPREHEN METABOLIC PANEL: CPT

## 2023-08-28 RX ORDER — METRONIDAZOLE 500 MG/100ML
500 INJECTION, SOLUTION INTRAVENOUS ONCE
Status: DISCONTINUED | OUTPATIENT
Start: 2023-08-28 | End: 2023-08-28

## 2023-08-28 RX ORDER — SODIUM CHLORIDE 0.9 % (FLUSH) 0.9 %
5-40 SYRINGE (ML) INJECTION PRN
Status: DISCONTINUED | OUTPATIENT
Start: 2023-08-28 | End: 2023-08-29

## 2023-08-28 RX ORDER — SODIUM CHLORIDE 0.9 % (FLUSH) 0.9 %
5-40 SYRINGE (ML) INJECTION EVERY 12 HOURS SCHEDULED
Status: DISCONTINUED | OUTPATIENT
Start: 2023-08-28 | End: 2023-08-29

## 2023-08-28 RX ORDER — 0.9 % SODIUM CHLORIDE 0.9 %
1000 INTRAVENOUS SOLUTION INTRAVENOUS ONCE
Status: COMPLETED | OUTPATIENT
Start: 2023-08-28 | End: 2023-08-28

## 2023-08-28 RX ORDER — SODIUM CHLORIDE 9 MG/ML
INJECTION, SOLUTION INTRAVENOUS PRN
Status: DISCONTINUED | OUTPATIENT
Start: 2023-08-28 | End: 2023-08-29

## 2023-08-28 RX ADMIN — IOPAMIDOL 75 ML: 755 INJECTION, SOLUTION INTRAVENOUS at 17:47

## 2023-08-28 RX ADMIN — WATER 2000 MG: 1 INJECTION INTRAMUSCULAR; INTRAVENOUS; SUBCUTANEOUS at 19:43

## 2023-08-28 RX ADMIN — Medication 10 ML: at 21:01

## 2023-08-28 RX ADMIN — METRONIDAZOLE 500 MG: 500 INJECTION, SOLUTION INTRAVENOUS at 19:44

## 2023-08-28 RX ADMIN — SODIUM CHLORIDE 1000 ML: 9 INJECTION, SOLUTION INTRAVENOUS at 15:51

## 2023-08-28 NOTE — H&P
Hospitalist History & Physical      PCP: Khushboo Dia DO    Date of Service: Pt seen/examined on 8/28/2023   Placed in Observation. Chief Complaint:  had concerns including Altered Mental Status (Family last talked to patient 1800 last night, they sent police for wellness check and she was found on the floor unresponsive. Patient is normally A&O x4 at baseline. ). History Of Present Illness:    Ms. Helga Harper, a 80y.o. year old female  who  has a past medical history of Diabetes ([address]) and Hypertension. Patient presented to the emergency department altered mental status. Last known well was about 6:00 in the evening. Patient was found today wedged between her bed and the wall. She was nonverbal when she was found. Vital signs within normal limits and stable. The patient is afebrile. Laboratory studies demonstrate BUN 24, creatinine 1.1, glucose 227, troponin 36 with repeat of 34 and hemoglobin 11.2. Imaging shows subtle edema along the posterior wall of the proximal sigmoid colon possibly representing mild uncomplicated acute diverticulitis. Past Medical History:   Diagnosis Date    Diabetes ([address])     Hypertension        Past Surgical History:   Procedure Laterality Date    HYSTERECTOMY (CERVIX STATUS UNKNOWN)         Prior to Admission medications    Medication Sig Start Date End Date Taking? Authorizing Provider   insulin lispro (HUMALOG) 100 UNIT/ML injection vial Inject into the skin 3 times daily (before meals)    Historical Provider, MD   pregabalin (LYRICA) 25 MG capsule Take 25 mg by mouth 2 times daily. Historical Provider, MD   aspirin-acetaminophen-caffeine (Ana Salgado) 276-124-83 MG per tablet Take 1 tablet by mouth every 6 hours as needed for Headaches    Historical Provider, MD   traMADol (ULTRAM) 50 MG tablet Take 50 mg by mouth every 6 hours as needed for Pain.     Historical Provider, MD   nortriptyline (PAMELOR) 10 MG capsule Take 1 capsule by mouth nightly exam:->tenderness Decision Support Exception - unselect if not a suspected or confirmed emergency medical condition->Emergency Medical Condition (MA) What reading provider will be dictating this exam?->CRC FINDINGS: Lower Chest: The heart is normal in size. Prominent calcification of mitral valve. Prominent calcified coronary atherosclerosis. Trace pericardial and left pleural effusions. Interstitial edema is seen in the lung bases. Organs: Liver: Normal in size and contour. Hepatic steatosis noted. Small area of fatty sparing noted in the lateral segment of the left lobe. Gallbladder: Unremarkable. Pancreas: Severe fatty atrophy of the pancreas. Minimal pancreatic parenchyma is identified. No mass, ductal dilatation or edema is seen. Spleen:  Unremarkable. Adrenals: Unremarkable. Kidneys: Atrophy noted the upper of the left kidney. The kidneys are otherwise unremarkable. No hydronephrosis. GI/Bowel: Prominent sigmoid diverticulosis. Subtle edema along the posterior aspect of the proximal sigmoid colon which may represent mild acute diverticulitis. No bowel wall thickening or obstruction. Normal appendix. Pelvis: The uterus and adnexa are without acute abnormality. Peritoneum/Retroperitoneum: Moderate calcified atherosclerosis is seen in the aorta. No aneurysm. No lymphadenopathy. No free air or free fluid is seen. Bones/Soft Tissues: The visualized bones are intact without fracture or focal lesion. 1. Subtle edema along the posterior wall of the proximal sigmoid colon possibly representing mild uncomplicated acute diverticulitis. 2. Evidence of CHF exacerbation (cardiomegaly with trace pericardial and left pleural effusions and interstitial edema). XR CHEST PORTABLE    Result Date: 8/28/2023  EXAMINATION: ONE XRAY VIEW OF THE CHEST 8/28/2023 3:40 pm COMPARISON: None.  HISTORY: ORDERING SYSTEM PROVIDED HISTORY: AMS TECHNOLOGIST PROVIDED HISTORY: Reason for exam:->AMS What reading provider will be dictating this exam?->CRC FINDINGS: Patient is in suboptimal inspiration, resulting in crowding of vascular markings. No active airspace consolidation. The heart is mildly enlarged. No pneumothorax. There is blunting of the left costophrenic angle. There is mild elevation of the right hemidiaphragm. Mild cardiomegaly and suspect minimal left pleural effusion. ASSESSMENT:  -Altered mental status  -Mild uncomplicated diverticulitis  -CKD stage III  -Hypertension  -Anxiety/depression      PLAN:  -Admit to medicine  -Consult neurology  -MRI of the brain without contrast  -Telemetry  Monitor renal function avoid nephrotoxic medications  -Continue home medications      Diet: No diet orders on file  Code Status: No Order  Surrogate decision maker confirmed with patient:   Extended Emergency Contact Information  Primary Emergency Contact: [address] [place] [address] Phone: [phone]  Relation: Child  Secondary Emergency Contact: Jana Chan  [place] Phone: [phone]  Relation: Grandchild    DVT Prophylaxis: []Lovenox []Heparin []PCD [] [address] []Encouraged ambulation  Disposition: []Med/Surg [] Intermediate [] ICU/CCU  Admit status: [] Observation [] Inpatient     +++++++++++++++++++++++++++++++++++++++++++++++++  Fernando Quest, DO  +++++++++++++++++++++++++++++++++++++++++++++++++  NOTE: This report was transcribed using voice recognition software. Every effort was made to ensure accuracy; however, inadvertent computerized transcription errors may be present.

## 2023-08-28 NOTE — ED PROVIDER NOTES
[address] ENCOUNTER      Pt Name: Junior Ivey  MRN: [id number]  [address] [facility] [place] 1934  Date of evaluation: 8/28/2023  Provider: Clarke Bartlett DO  PCP: Lonnie Pearce DO  Note Started: 3:10 PM EDT 8/28/23    CHIEF COMPLAINT       Chief Complaint   Patient presents with    Altered Mental Status     Family last talked to patient 1800 last night, they sent police for wellness check and she was found on the floor unresponsive. Patient is normally A&O x4 at baseline. HISTORY OF PRESENT ILLNESS: 1 or more Elements   History From: EMS  Limitations to history : Altered Mental Status    Junior Ivey is a 80 y.o. female who presents to the ED due to altered mental status. Patient reportedly was last known well around 6 PM last night. She was found today wedged between her bed and the wall around 3 PM.  She apparently was nonverbal when she was found. She is moving all her extremities but is not talking at this time. Her abdomen is slightly distended and mildly tender on examination. She does not appear to have any other focal neurologic deficits at this time other than being nonverbal.  Remainder of exam is limited due to patient's current status. Nursing Notes were all reviewed and agreed with or any disagreements were addressed in the HPI. REVIEW OF SYSTEMS :    Positives and Pertinent negatives as per HPI.      SURGICAL HISTORY     Past Surgical History:   Procedure Laterality Date    HYSTERECTOMY (CERVIX STATUS UNKNOWN)         CURRENTMEDICATIONS       Previous Medications    AL HYD-MG TR-ALG AC-SOD BICARB (GAVISCON-2 PO)        AMLODIPINE (NORVASC) 5 MG TABLET        AMLODIPINE (NORVASC) 5 MG TABLET    Take 5 mg by mouth daily    ASPIRIN (ASPIRIN 81) 81 MG EC TABLET        ASPIRIN-ACETAMINOPHEN-CAFFEINE (EXCEDRIN MIGRAINE) 250-250-65 MG PER TABLET    Take 1 tablet by mouth every 6 hours as needed for Headaches    FLUTICASONE infection or inflammation by obtaining a WBC count, or any signs of acute anemia by interpreting hemoglobin. BMP was ordered to evaluate for any electrolyte imbalances, kidney function, or any elevations in anion gap. Troponin ordered to evaluate for possible cardiac etiology of symptoms including but not limited to STEMI or NSTEMI. Urinalysis ordered to evaluate for a UTI and/or hematuria. Lipase level was ordered to evaluate for possible elevations which suggest pancreatic etiology of symptoms. Lactic acid level obtained to evaluate for signs of organ hypoperfusion or ischemia. Viral swabs are ordered to evaluate possible viral etiology of symptoms. Protime-INR ordered in case hemorrhages are found on imaging that requires anticoagulation reversal or surgical intervention. Blood cultures are ordered to evaluate, rule out and, if present, treat bacteremia with antibiotics narrowed down to the found organism. Thyroid studies ordered to evaluate for hyperthyroidism or hypothyroidism. CK level ordered for suspicion of rhabdomyolysis. Hepatic function panel obtained to assess liver function to dose medications, and to exclude acute elevation in transaminases which may indicate hepatitis or other hepatic pathology. Ammonia levels will also be ordered to evaluate for acute liver failure or hepatic encephalopathy. CT head without contrast was ordered to evaluate for acute or chronic intracranial hemorrhage or masses. Chest x-ray is ordered to evaluate for any possible signs of, but without limitation to, pneumonia, pleural effusions, cardiomegaly, pneumothorax, atelectasis, rib or sternal abnormalities including fractures. A CT abdomen with IV contrast was ordered to evaluate for, but without limitation to, ureterolithiasis, nephrolithiasis, constipation, hollow organ perforation, small bowel obstruction, bowel ischemia, pneumoperitoneum, diverticulitis, cholecystitis, appendicitis, perforation.   Patient initially given 1 L normal saline bolus. CBC revealed mild anemia with hemoglobin 11.2. BMP revealed a slightly elevated creatinine of 1.1 otherwise normal values. Hepatic function panel was normal.  Lipase and lactic acid were normal as well. Coags were normal.  TSH and ammonia were within normal limits. CK was 104. Urine and serum drug screen was negative. Initial troponin was 36 with a repeat of 34. Urinalysis did not reveal significant findings concerning for infection. COVID test was negative. Blood cultures were sent. Chest x-ray revealed mild cardiomegaly and minimal left pleural effusion. Head CT revealed no acute intracranial abnormality with atrophy and periventricular leukomalacia in addition to an old lacunar infarct. CT abdomen pelvis revealed subtle edema along the posterior wall of the proximal sigmoid colon representing mild uncomplicated diverticulitis. Patient was given a dose of Rocephin and Flagyl in the ED. She will be admitted for further work-up and treatment of her diverticulitis and acutely altered mental status by Dr. Jennifer Salgado at this time. Disposition Considerations (Tests not ordered but considered, Shared Decision Making, Pt Expectation of Test or Tx.):     FINAL IMPRESSION      1. Altered mental status, unspecified altered mental status type    2. Diverticulitis of colon          DISPOSITION/PLAN     DISPOSITION Admitted 08/28/2023 07:45:25 PM    PATIENT REFERRED TO:  No follow-up provider specified.     DISCHARGE MEDICATIONS:  New Prescriptions    No medications on file       DISCONTINUED MEDICATIONS:  Discontinued Medications    No medications on file            (Please note that portions of this note were completed with a voice recognition program.  Efforts were made to edit the dictations but occasionally words are mis-transcribed.)    Alba Ford DO (electronically signed)       Alba Ford DO  Resident  08/28/23 2044

## 2023-08-29 LAB
ALBUMIN SERPL-MCNC: 3.9 G/DL (ref 3.5–5.2)
ALP SERPL-CCNC: 85 U/L (ref 35–104)
ALT SERPL-CCNC: 23 U/L (ref 0–32)
ANION GAP SERPL CALCULATED.3IONS-SCNC: 12 MMOL/L (ref 7–16)
AST SERPL-CCNC: 27 U/L (ref 0–31)
BASOPHILS # BLD: 0.02 K/UL (ref 0–0.2)
BASOPHILS NFR BLD: 0 % (ref 0–2)
BILIRUB SERPL-MCNC: 0.5 MG/DL (ref 0–1.2)
BNP SERPL-MCNC: 2568 PG/ML (ref 0–450)
BUN SERPL-MCNC: 19 MG/DL (ref 6–23)
CALCIUM SERPL-MCNC: 8.7 MG/DL (ref 8.6–10.2)
CHLORIDE SERPL-SCNC: 106 MMOL/L (ref 98–107)
CHOLEST SERPL-MCNC: 169 MG/DL
CO2 SERPL-SCNC: 22 MMOL/L (ref 22–29)
CREAT SERPL-MCNC: 1.1 MG/DL (ref 0.5–1)
EKG ATRIAL RATE: 62 BPM
EKG P AXIS: 74 DEGREES
EKG P-R INTERVAL: 200 MS
EKG Q-T INTERVAL: 474 MS
EKG QRS DURATION: 88 MS
EKG QTC CALCULATION (BAZETT): 481 MS
EKG R AXIS: -18 DEGREES
EKG T AXIS: 122 DEGREES
EKG VENTRICULAR RATE: 62 BPM
EOSINOPHIL # BLD: 0 K/UL (ref 0.05–0.5)
EOSINOPHILS RELATIVE PERCENT: 0 % (ref 0–6)
ERYTHROCYTE [DISTWIDTH] IN BLOOD BY AUTOMATED COUNT: 14.3 % (ref 11.5–15)
FOLATE SERPL-MCNC: >20 NG/ML (ref 4.8–24.2)
GFR SERPL CREATININE-BSD FRML MDRD: 51 ML/MIN/1.73M2
GLUCOSE BLD-MCNC: 199 MG/DL (ref 74–99)
GLUCOSE BLD-MCNC: 230 MG/DL (ref 74–99)
GLUCOSE BLD-MCNC: 248 MG/DL (ref 74–99)
GLUCOSE BLD-MCNC: 273 MG/DL (ref 74–99)
GLUCOSE SERPL-MCNC: 172 MG/DL (ref 74–99)
HBA1C MFR BLD: 6.4 % (ref 4–5.6)
HCT VFR BLD AUTO: 32 % (ref 34–48)
HDLC SERPL-MCNC: 57 MG/DL
HGB BLD-MCNC: 10.4 G/DL (ref 11.5–15.5)
IMM GRANULOCYTES # BLD AUTO: 0.07 K/UL (ref 0–0.58)
IMM GRANULOCYTES NFR BLD: 1 % (ref 0–5)
IRON SATN MFR SERPL: 13 % (ref 15–50)
IRON SERPL-MCNC: 39 UG/DL (ref 37–145)
LDLC SERPL CALC-MCNC: 98 MG/DL
LYMPHOCYTES NFR BLD: 0.58 K/UL (ref 1.5–4)
LYMPHOCYTES RELATIVE PERCENT: 5 % (ref 20–42)
MCH RBC QN AUTO: 33.4 PG (ref 26–35)
MCHC RBC AUTO-ENTMCNC: 32.5 G/DL (ref 32–34.5)
MCV RBC AUTO: 102.9 FL (ref 80–99.9)
MONOCYTES NFR BLD: 0.64 K/UL (ref 0.1–0.95)
MONOCYTES NFR BLD: 6 % (ref 2–12)
NEUTROPHILS NFR BLD: 89 % (ref 43–80)
NEUTS SEG NFR BLD: 10.36 K/UL (ref 1.8–7.3)
PLATELET # BLD AUTO: 185 K/UL (ref 130–450)
PMV BLD AUTO: 10.7 FL (ref 7–12)
POTASSIUM SERPL-SCNC: 3.6 MMOL/L (ref 3.5–5)
PROT SERPL-MCNC: 6.8 G/DL (ref 6.4–8.3)
RBC # BLD AUTO: 3.11 M/UL (ref 3.5–5.5)
RBC # BLD: ABNORMAL 10*6/UL
RBC # BLD: ABNORMAL 10*6/UL
SODIUM SERPL-SCNC: 140 MMOL/L (ref 132–146)
TIBC SERPL-MCNC: 300 UG/DL (ref 250–450)
TRIGL SERPL-MCNC: 72 MG/DL
TROPONIN I SERPL HS-MCNC: 39 NG/L (ref 0–9)
VIT B12 SERPL-MCNC: 839 PG/ML (ref 211–946)
VLDLC SERPL CALC-MCNC: 14 MG/DL
WBC OTHER # BLD: 11.7 K/UL (ref 4.5–11.5)

## 2023-08-29 PROCEDURE — 97165 OT EVAL LOW COMPLEX 30 MIN: CPT

## 2023-08-29 PROCEDURE — 82607 VITAMIN B-12: CPT

## 2023-08-29 PROCEDURE — 80053 COMPREHEN METABOLIC PANEL: CPT

## 2023-08-29 PROCEDURE — 99223 1ST HOSP IP/OBS HIGH 75: CPT | Performed by: PSYCHIATRY & NEUROLOGY

## 2023-08-29 PROCEDURE — 83036 HEMOGLOBIN GLYCOSYLATED A1C: CPT

## 2023-08-29 PROCEDURE — 6360000002 HC RX W HCPCS: Performed by: FAMILY MEDICINE

## 2023-08-29 PROCEDURE — 6360000002 HC RX W HCPCS: Performed by: NURSE PRACTITIONER

## 2023-08-29 PROCEDURE — 83550 IRON BINDING TEST: CPT

## 2023-08-29 PROCEDURE — 85025 COMPLETE CBC W/AUTO DIFF WBC: CPT

## 2023-08-29 PROCEDURE — 82746 ASSAY OF FOLIC ACID SERUM: CPT

## 2023-08-29 PROCEDURE — 83540 ASSAY OF IRON: CPT

## 2023-08-29 PROCEDURE — 6370000000 HC RX 637 (ALT 250 FOR IP): Performed by: FAMILY MEDICINE

## 2023-08-29 PROCEDURE — 6370000000 HC RX 637 (ALT 250 FOR IP): Performed by: NURSE PRACTITIONER

## 2023-08-29 PROCEDURE — 2580000003 HC RX 258: Performed by: FAMILY MEDICINE

## 2023-08-29 PROCEDURE — G0378 HOSPITAL OBSERVATION PER HR: HCPCS

## 2023-08-29 PROCEDURE — 36415 COLL VENOUS BLD VENIPUNCTURE: CPT

## 2023-08-29 PROCEDURE — 96372 THER/PROPH/DIAG INJ SC/IM: CPT

## 2023-08-29 PROCEDURE — 97161 PT EVAL LOW COMPLEX 20 MIN: CPT

## 2023-08-29 PROCEDURE — 87040 BLOOD CULTURE FOR BACTERIA: CPT

## 2023-08-29 PROCEDURE — 93010 ELECTROCARDIOGRAM REPORT: CPT | Performed by: INTERNAL MEDICINE

## 2023-08-29 PROCEDURE — 97530 THERAPEUTIC ACTIVITIES: CPT

## 2023-08-29 PROCEDURE — 82962 GLUCOSE BLOOD TEST: CPT

## 2023-08-29 PROCEDURE — 80061 LIPID PANEL: CPT

## 2023-08-29 PROCEDURE — 83880 ASSAY OF NATRIURETIC PEPTIDE: CPT

## 2023-08-29 PROCEDURE — S5553 INSULIN LONG ACTING 5 U: HCPCS | Performed by: FAMILY MEDICINE

## 2023-08-29 PROCEDURE — 1200000000 HC SEMI PRIVATE

## 2023-08-29 PROCEDURE — 84484 ASSAY OF TROPONIN QUANT: CPT

## 2023-08-29 RX ORDER — ASPIRIN 81 MG/1
81 TABLET, CHEWABLE ORAL DAILY
Status: DISCONTINUED | OUTPATIENT
Start: 2023-08-29 | End: 2023-09-02 | Stop reason: HOSPADM

## 2023-08-29 RX ORDER — INSULIN LISPRO 100 [IU]/ML
0-4 INJECTION, SOLUTION INTRAVENOUS; SUBCUTANEOUS NIGHTLY
Status: DISCONTINUED | OUTPATIENT
Start: 2023-08-29 | End: 2023-08-30

## 2023-08-29 RX ORDER — SODIUM CHLORIDE 9 MG/ML
INJECTION, SOLUTION INTRAVENOUS PRN
Status: DISCONTINUED | OUTPATIENT
Start: 2023-08-29 | End: 2023-09-02 | Stop reason: HOSPADM

## 2023-08-29 RX ORDER — PROMETHAZINE HYDROCHLORIDE 12.5 MG/1
12.5 TABLET ORAL EVERY 6 HOURS PRN
Status: DISCONTINUED | OUTPATIENT
Start: 2023-08-29 | End: 2023-09-02 | Stop reason: HOSPADM

## 2023-08-29 RX ORDER — INSULIN LISPRO 100 [IU]/ML
0-4 INJECTION, SOLUTION INTRAVENOUS; SUBCUTANEOUS
Status: DISCONTINUED | OUTPATIENT
Start: 2023-08-29 | End: 2023-08-30

## 2023-08-29 RX ORDER — ONDANSETRON 2 MG/ML
4 INJECTION INTRAMUSCULAR; INTRAVENOUS EVERY 6 HOURS PRN
Status: DISCONTINUED | OUTPATIENT
Start: 2023-08-29 | End: 2023-09-02 | Stop reason: HOSPADM

## 2023-08-29 RX ORDER — ENOXAPARIN SODIUM 100 MG/ML
30 INJECTION SUBCUTANEOUS DAILY
Status: DISCONTINUED | OUTPATIENT
Start: 2023-08-29 | End: 2023-09-02 | Stop reason: HOSPADM

## 2023-08-29 RX ORDER — SODIUM CHLORIDE 0.9 % (FLUSH) 0.9 %
10 SYRINGE (ML) INJECTION EVERY 12 HOURS SCHEDULED
Status: DISCONTINUED | OUTPATIENT
Start: 2023-08-29 | End: 2023-09-02 | Stop reason: HOSPADM

## 2023-08-29 RX ORDER — POLYETHYLENE GLYCOL 3350 17 G/17G
17 POWDER, FOR SOLUTION ORAL DAILY PRN
Status: DISCONTINUED | OUTPATIENT
Start: 2023-08-29 | End: 2023-09-02 | Stop reason: HOSPADM

## 2023-08-29 RX ORDER — AMLODIPINE BESYLATE 10 MG/1
10 TABLET ORAL DAILY
Status: DISCONTINUED | OUTPATIENT
Start: 2023-08-30 | End: 2023-09-02 | Stop reason: HOSPADM

## 2023-08-29 RX ORDER — ACETAMINOPHEN 650 MG/1
650 SUPPOSITORY RECTAL EVERY 6 HOURS PRN
Status: DISCONTINUED | OUTPATIENT
Start: 2023-08-29 | End: 2023-09-02 | Stop reason: HOSPADM

## 2023-08-29 RX ORDER — FUROSEMIDE 10 MG/ML
40 INJECTION INTRAMUSCULAR; INTRAVENOUS DAILY
Status: DISCONTINUED | OUTPATIENT
Start: 2023-08-29 | End: 2023-08-29

## 2023-08-29 RX ORDER — ACETAMINOPHEN 325 MG/1
650 TABLET ORAL EVERY 6 HOURS PRN
Status: DISCONTINUED | OUTPATIENT
Start: 2023-08-29 | End: 2023-09-02 | Stop reason: HOSPADM

## 2023-08-29 RX ORDER — PREGABALIN 25 MG/1
25 CAPSULE ORAL 2 TIMES DAILY
Status: DISCONTINUED | OUTPATIENT
Start: 2023-08-29 | End: 2023-09-02 | Stop reason: HOSPADM

## 2023-08-29 RX ORDER — AMLODIPINE BESYLATE 5 MG/1
5 TABLET ORAL DAILY
Status: DISCONTINUED | OUTPATIENT
Start: 2023-08-29 | End: 2023-08-29

## 2023-08-29 RX ORDER — INSULIN GLARGINE-YFGN 100 [IU]/ML
30 INJECTION, SOLUTION SUBCUTANEOUS DAILY
Status: DISCONTINUED | OUTPATIENT
Start: 2023-08-29 | End: 2023-09-02 | Stop reason: HOSPADM

## 2023-08-29 RX ORDER — NORTRIPTYLINE HYDROCHLORIDE 10 MG/1
10 CAPSULE ORAL NIGHTLY
Status: DISCONTINUED | OUTPATIENT
Start: 2023-08-29 | End: 2023-08-29

## 2023-08-29 RX ORDER — SODIUM CHLORIDE 0.9 % (FLUSH) 0.9 %
10 SYRINGE (ML) INJECTION PRN
Status: DISCONTINUED | OUTPATIENT
Start: 2023-08-29 | End: 2023-09-02 | Stop reason: HOSPADM

## 2023-08-29 RX ORDER — SODIUM CHLORIDE 9 MG/ML
INJECTION, SOLUTION INTRAVENOUS CONTINUOUS
Status: DISCONTINUED | OUTPATIENT
Start: 2023-08-29 | End: 2023-08-29

## 2023-08-29 RX ORDER — ATORVASTATIN CALCIUM 40 MG/1
40 TABLET, FILM COATED ORAL NIGHTLY
Status: DISCONTINUED | OUTPATIENT
Start: 2023-08-29 | End: 2023-09-02 | Stop reason: HOSPADM

## 2023-08-29 RX ORDER — DEXTROSE MONOHYDRATE 100 MG/ML
INJECTION, SOLUTION INTRAVENOUS CONTINUOUS PRN
Status: DISCONTINUED | OUTPATIENT
Start: 2023-08-29 | End: 2023-09-02 | Stop reason: HOSPADM

## 2023-08-29 RX ORDER — CLOPIDOGREL BISULFATE 75 MG/1
75 TABLET ORAL DAILY
Status: ON HOLD | COMMUNITY

## 2023-08-29 RX ADMIN — ENOXAPARIN SODIUM 30 MG: 100 INJECTION SUBCUTANEOUS at 09:07

## 2023-08-29 RX ADMIN — ASPIRIN 81 MG 81 MG: 81 TABLET ORAL at 12:51

## 2023-08-29 RX ADMIN — INSULIN LISPRO 2 UNITS: 100 INJECTION, SOLUTION INTRAVENOUS; SUBCUTANEOUS at 12:51

## 2023-08-29 RX ADMIN — FUROSEMIDE 40 MG: 10 INJECTION, SOLUTION INTRAMUSCULAR; INTRAVENOUS at 12:51

## 2023-08-29 RX ADMIN — SODIUM CHLORIDE, PRESERVATIVE FREE 10 ML: 5 INJECTION INTRAVENOUS at 12:51

## 2023-08-29 RX ADMIN — ATORVASTATIN CALCIUM 40 MG: 40 TABLET, FILM COATED ORAL at 20:04

## 2023-08-29 RX ADMIN — SODIUM CHLORIDE, PRESERVATIVE FREE 10 ML: 5 INJECTION INTRAVENOUS at 20:04

## 2023-08-29 RX ADMIN — INSULIN LISPRO 1 UNITS: 100 INJECTION, SOLUTION INTRAVENOUS; SUBCUTANEOUS at 17:29

## 2023-08-29 RX ADMIN — AMLODIPINE BESYLATE 5 MG: 5 TABLET ORAL at 09:07

## 2023-08-29 RX ADMIN — PREGABALIN 25 MG: 25 CAPSULE ORAL at 20:04

## 2023-08-29 RX ADMIN — INSULIN GLARGINE-YFGN 30 UNITS: 100 INJECTION, SOLUTION SUBCUTANEOUS at 09:06

## 2023-08-29 RX ADMIN — PREGABALIN 25 MG: 25 CAPSULE ORAL at 12:51

## 2023-08-29 RX ADMIN — SODIUM CHLORIDE: 9 INJECTION, SOLUTION INTRAVENOUS at 01:05

## 2023-08-29 ASSESSMENT — PAIN SCALES - WONG BAKER: WONGBAKER_NUMERICALRESPONSE: 2

## 2023-08-29 NOTE — PROGRESS NOTES
Occupational Therapy  OCCUPATIONAL THERAPY INITIAL EVALUATION    SHASHI Peguero [address] [facility]   [address] [place], [place]      Date:2023                                                Patient Name: Binh Awad  MRN: [id number]  : 1934  Room: [address] [place], [place] [address]A    Evaluating [address] [place] [address]    Referring Provider: Tanvi Borden DO  Specific Provider Orders/Date: OT eval and treat 23     Diagnosis: Diverticulitis of colon [K57.32]  Altered mental status, unspecified altered mental status type [R41.82]  AMS (altered mental status) [R41.82]   Pt admitted to hospital on 23 for AMS, found on floor     Pertinent Medical History:  has a past medical history of Diabetes ([address]) and Hypertension.        Precautions:  Fall Risk, cognition (nonverbal), sitter, incontinent, alarm    Assessment of current deficits    [x] Functional mobility  [x]ADLs  [x] Strength               [x]Cognition    [x] Functional transfers   [x] IADLs         [x] Safety Awareness   [x]Endurance    [] Fine Coordination              [x] Balance      [] Vision/perception   []Sensation     []Gross Motor Coordination  [] ROM  [] Delirium                   [] Motor Control     OT PLAN OF CARE   OT POC based on physician orders, patient diagnosis and results of clinical assessment    Frequency/Duration 1-3 days/wk for 2 weeks PRN   Specific OT Treatment Interventions to include:   * Instruction/training on adapted ADL techniques and AE recommendations to increase functional independence within precautions       * Training on energy conservation strategies, correct breathing pattern and techniques to improve independence/tolerance for self-care routine  * Functional transfer/mobility training/DME recommendations for increased independence, safety, and fall prevention  * Patient/Family education to increase follow through with safety techniques and functional independence  * Recommendation of environmental modifications for increased safety with functional transfers/mobility and ADLs  * Cognitive retraining/development of therapeutic activities to improve problem solving, judgement, memory, and attention for increased safety/participation in ADL/IADL tasks  * Therapeutic exercise to improve motor endurance, ROM, and functional strength for ADLs/functional transfers  * Therapeutic activities to facilitate/challenge dynamic balance, stand tolerance for increased safety and independence with ADLs  * Therapeutic activities to facilitate gross/fine motor skills for increased independence with ADLs      OTMRS  Modified [place] Scale (MRS)  Score     Description  0             No symptoms  1             No significant disability despite symptoms  2             Slight disability; able to look after own affairs  3             Moderate disability; able to ambulate without assist/ requires assist with ADLs  4             Moderate/Severe disability;requires assist to ambulate/assist with ADLs  5             Severe disability;bedridden/incontinent   6               Score:   4    Recommended Adaptive Equipment: TBD     Home Living: Pt lives alone in an apt     Equipment owned: walker    Prior Level of Function: unable to obtain ADL PLOF, assist with IADLs (cleaning lady and receives Meals on Wheels); ambulated w/ walker  Above information per chart. Unable to obtain from pt-family/caregiver not present    Pain Level: Pt c/o no pain this session    Cognition: Alert w/ tactile and verbal stim.  Pt looks to name however nonverbal during session.; Follows 1 step directions inconsistently   Memory: unable to formally assess as pt nonverbal   Sequencing:  poor   Problem solving:  poor   Judgement/safety:  poor     Functional Assessment:  AM-PAC Daily Activity Raw Score:    Initial Eval Status  Date: 23 Treatment Status  Date: STGs = LTGs  Time frame: 10-14 days   Feeding Moderate Assist   Supervision Grooming Moderate Assist   Combed hair  Increased assist to initiate task - improved as progressed and once initiated  Supervision    UB Dressing Maximal Assist   Minimal Assist    LB Dressing Dependent   Moderate Assist    Bathing Dependent  Moderate Assist    Toileting Dependent   Rolled L/R for hygiene task  Moderate Assist    Bed Mobility  Supine to sit: Maximal Assist   Sit to supine: Maximal Assist   Supine to sit: Minimal Assist   Sit to supine: Minimal Assist    Functional Transfers Moderate Assist x2  Minimal Assist    Functional Mobility Maximal Assist x2  ~2 lateral sidesteps to [facility] w/ max cues and side by side assist. Difficulty advancing B LE's  Moderate Assist    Balance Sitting:     Static:  Min A    Dynamic: Mod A  Standing: Mod A-Max A x1-2     Activity Tolerance Poor+  Fair+   Visual/  Perceptual Glasses: no                  Hand Dominance R   AROM (PROM) Strength Additional Info:    RUE  WFL Distal: 3+/5  With increased cues good  and wfl FMC/dexterity noted during ADL tasks       LUE WFL Distal: 3+/5  With increased cues good  and wfl FMC/dexterity noted during ADL tasks       Hearing: Appears WFL  Sensation:  Unable to assess d/t cognition  Tone: WFL  Edema: none noted    Comments: Upon arrival patient lying in bed. Therapist educated pt on role of OT. At end of session, patient semi-supine in bed (bed alarm on, sitter present) with call light and phone within reach, all lines and tubes intact. Overall patient demonstrated decreased independence and safety during completion of ADL/functional transfer/mobility tasks. Pt would benefit from continued skilled OT to increase safety and independence with completion of ADL/IADL tasks for functional independence and quality of life.     Treatment: OT treatment provided this date includes: Facilitation of bed mobility (education/cues for body mechanics, sequencing and safety), unsupported sitting balance (addressing posture, safety, dynamic

## 2023-08-29 NOTE — PROGRESS NOTES
Spoke to POA, pts Son provided information regarding pts MRI screening form. Son made aware pt is in room and comfortable.

## 2023-08-29 NOTE — PROGRESS NOTES
4 Eyes Skin Assessment     NAME:  Trav Mendes  YOB: 1934  MEDICAL RECORD NUMBER:  [id number]    The patient is being assessed for  {Reason for Assessment:46946}    I agree that at least one RN has performed a thorough Head to Toe Skin Assessment on the patient. ALL assessment sites listed below have been assessed. Areas assessed by both nurses:    Head, Face, Ears, Shoulders, Back, Chest, Arms, Elbows, Hands, Sacrum. Buttock, Coccyx, Ischium, Legs. Feet and Heels, and Under Medical Devices         Does the Patient have a Wound?  No noted wound(s)       Raimundo Prevention initiated by RN: Yes  Wound Care Orders initiated by RN: No    Pressure Injury (Stage 3,4, Unstageable, DTI, NWPT, and Complex wounds) if present, place Wound referral order by RN under : No    New Ostomies, if present place, Ostomy referral order under : No     Nurse 1 eSignature: Electronically signed by Malou Pretty RN on 8/29/23 at 10:00 AM EDT    **SHARE this note so that the co-signing nurse can place an eSignature**    Nurse 2 eSignature: {Esignature:[id number]}

## 2023-08-29 NOTE — PROGRESS NOTES
Unable to complete admission assessment or any further assessments due to patient's altered mental status and inability to talk.

## 2023-08-29 NOTE — CARE COORDINATION
08/29/23 Transition of Care: Patient is observation due to having worsening altered mental status per her son, Zen Israel. He states she resides home alone in an apartment. He states recently she has had to use a walker. She has a laundry lady/cleaning lady and received meals on wheels. She has a  Deb Claudio from [employer]. She has a life alert button which is broken. She follows with Dr Derek Kumari in [place]. She uses [address] [place] [address] in [place]. He is requesting a referral to [facility] as [place] his [place]. Referral called to [facility] for review. Currently patient is pending an MRI brain/echo/neuro consult. She was found to have abdominal tenderness on assessment and a CT scan was completed with findings of acute diverticulitis and findings of chf. Will await pcp to round for further plan of care.  Electronically signed by Ulices Garcia RN CM on 8/29/2023 at 9:30 AM

## 2023-08-29 NOTE — CARE COORDINATION
08/29/23 Update CM Note: Patient accepted at [facility]. Pt/Ot pending (therapy notitfied at 2311)GXP precert. PASSAR/denia/destination/ambulance(pended) form initiated. Envelope placed in soft chart.  Electronically signed by Tommy Frederick RN CM on 8/29/2023 at 1:26 PM

## 2023-08-29 NOTE — CONSULTS
NEUROLOGY CONSULT NOTE      Requesting Physician:  Regine Bonilla DO    Reason for Consult:  Evaluate for strokelike symptoms. History of Present Illness: Fred Fleming is a 80 y.o. female  with h/o DM and HTN who was admitted to [facility] on 8/28/2023 with presentation of altered mental status. Per review of chart and available information, patient was found unresponsive at home. Reports indicate that she was nonverbal at the time, but was moving all extremities. She was found on the day of admission wedged between her bed and the wall around 3 PM.  Family had last spoken to the patient approximately 1800 the day before. When unable to get a response they sent police to check on patient which was when she was found. Patient subsequently brought to the ED for further evaluation. On initial assessment she remained nonverbal, but did not appear to have any focal neurologic deficits otherwise. NIHSS in ED was 3 due to mutism. CT scan of the head was negative for any acute intracranial maladies but there was note of old lacunar infarct within the anterior aspect of the right thalamus. Past Medical History:        Diagnosis Date    Diabetes ([address])     Hypertension            Procedure Laterality Date    HYSTERECTOMY (CERVIX STATUS UNKNOWN)         Social History:  Social History     Tobacco Use   Smoking Status Never   Smokeless Tobacco Never     Social History     Substance and Sexual Activity   Alcohol Use Never     Social History     Substance and Sexual Activity   Drug Use Never         Family History:   History reviewed. No pertinent family history. Review of Systems:  Not available.      Allergies:    No Known Allergies     Current Medications:   insulin glargine-yfgn (SEMGLEE-YFGN) injection vial 30 Units, Daily  pregabalin (LYRICA) capsule 25 mg, BID  sodium chloride flush 0.9 % injection 10 mL, 2 times per day  sodium chloride flush 0.9 % injection 10 mL, PRN  0.9 % sodium

## 2023-08-29 NOTE — PROGRESS NOTES
This patient is on DVT Prophylaxis medication that requires a dose adjustment      Date Body Weight IBW  Adjusted BW SCr  CrCl Dialysis status   8/29/2023 130 lb (59 kg) Ideal body weight: 45.5 kg (100 lb 4.9 oz)  Adjusted ideal body weight: 50.9 kg (112 lb 3 oz) Serum creatinine: 1.1 mg/dL (H) 08/28/23 1537  Estimated creatinine clearance: 28 mL/min (A) N/a       Pharmacy has dose-adjusted the DVT Prophylaxis regimen to match   the recommendations from the following table        Ordered Medication:Lovenox 40mg daily    Order Changed/converted to: Lovenox 30mg daily      These changes were made per protocol according to the [facility] Pharmacist   Review for Appropriate Use and Automatic Dose Adjustments of   Subcutaneous Anticoagulants Policy     *Please note this dose may need readjusted if patient's condition changes. Please contact pharmacy with any questions regarding these changes.     NATANAEL Galvan [facility]  8/29/2023  5:21 AM

## 2023-08-30 ENCOUNTER — APPOINTMENT (OUTPATIENT)
Dept: MRI IMAGING | Age: 88
DRG: 064 | End: 2023-08-30
Payer: MEDICARE

## 2023-08-30 LAB
ANION GAP SERPL CALCULATED.3IONS-SCNC: 12 MMOL/L (ref 7–16)
BUN SERPL-MCNC: 22 MG/DL (ref 6–23)
CALCIUM SERPL-MCNC: 8.4 MG/DL (ref 8.6–10.2)
CHLORIDE SERPL-SCNC: 107 MMOL/L (ref 98–107)
CK SERPL-CCNC: 547 U/L (ref 20–180)
CO2 SERPL-SCNC: 24 MMOL/L (ref 22–29)
CREAT SERPL-MCNC: 1.3 MG/DL (ref 0.5–1)
ERYTHROCYTE [DISTWIDTH] IN BLOOD BY AUTOMATED COUNT: 14.3 % (ref 11.5–15)
GFR SERPL CREATININE-BSD FRML MDRD: 41 ML/MIN/1.73M2
GLUCOSE BLD-MCNC: 165 MG/DL (ref 74–99)
GLUCOSE BLD-MCNC: 238 MG/DL (ref 74–99)
GLUCOSE BLD-MCNC: 291 MG/DL (ref 74–99)
GLUCOSE BLD-MCNC: 315 MG/DL (ref 74–99)
GLUCOSE SERPL-MCNC: 161 MG/DL (ref 74–99)
HCT VFR BLD AUTO: 34.5 % (ref 34–48)
HGB BLD-MCNC: 10.8 G/DL (ref 11.5–15.5)
MCH RBC QN AUTO: 32.4 PG (ref 26–35)
MCHC RBC AUTO-ENTMCNC: 31.3 G/DL (ref 32–34.5)
MCV RBC AUTO: 103.6 FL (ref 80–99.9)
PLATELET # BLD AUTO: 203 K/UL (ref 130–450)
PMV BLD AUTO: 10.3 FL (ref 7–12)
POTASSIUM SERPL-SCNC: 3.2 MMOL/L (ref 3.5–5)
RBC # BLD AUTO: 3.33 M/UL (ref 3.5–5.5)
SODIUM SERPL-SCNC: 143 MMOL/L (ref 132–146)
WBC OTHER # BLD: 7.6 K/UL (ref 4.5–11.5)

## 2023-08-30 PROCEDURE — 70551 MRI BRAIN STEM W/O DYE: CPT

## 2023-08-30 PROCEDURE — 6370000000 HC RX 637 (ALT 250 FOR IP): Performed by: NURSE PRACTITIONER

## 2023-08-30 PROCEDURE — 82550 ASSAY OF CK (CPK): CPT

## 2023-08-30 PROCEDURE — 82962 GLUCOSE BLOOD TEST: CPT

## 2023-08-30 PROCEDURE — 6370000000 HC RX 637 (ALT 250 FOR IP)

## 2023-08-30 PROCEDURE — 6370000000 HC RX 637 (ALT 250 FOR IP): Performed by: FAMILY MEDICINE

## 2023-08-30 PROCEDURE — 80048 BASIC METABOLIC PNL TOTAL CA: CPT

## 2023-08-30 PROCEDURE — 85027 COMPLETE CBC AUTOMATED: CPT

## 2023-08-30 PROCEDURE — 6360000002 HC RX W HCPCS: Performed by: NURSE PRACTITIONER

## 2023-08-30 PROCEDURE — 99232 SBSQ HOSP IP/OBS MODERATE 35: CPT

## 2023-08-30 PROCEDURE — 1200000000 HC SEMI PRIVATE

## 2023-08-30 PROCEDURE — 36415 COLL VENOUS BLD VENIPUNCTURE: CPT

## 2023-08-30 PROCEDURE — 6360000002 HC RX W HCPCS: Performed by: FAMILY MEDICINE

## 2023-08-30 PROCEDURE — S5553 INSULIN LONG ACTING 5 U: HCPCS | Performed by: FAMILY MEDICINE

## 2023-08-30 PROCEDURE — 2580000003 HC RX 258: Performed by: FAMILY MEDICINE

## 2023-08-30 RX ORDER — LORAZEPAM 2 MG/ML
1 INJECTION INTRAMUSCULAR
Status: COMPLETED | OUTPATIENT
Start: 2023-08-30 | End: 2023-08-30

## 2023-08-30 RX ORDER — INSULIN LISPRO 100 [IU]/ML
0-8 INJECTION, SOLUTION INTRAVENOUS; SUBCUTANEOUS
Status: DISCONTINUED | OUTPATIENT
Start: 2023-08-30 | End: 2023-09-02 | Stop reason: HOSPADM

## 2023-08-30 RX ORDER — POTASSIUM CHLORIDE 7.45 MG/ML
10 INJECTION INTRAVENOUS
Status: COMPLETED | OUTPATIENT
Start: 2023-08-30 | End: 2023-08-30

## 2023-08-30 RX ORDER — CLOPIDOGREL BISULFATE 75 MG/1
75 TABLET ORAL DAILY
Status: DISCONTINUED | OUTPATIENT
Start: 2023-08-30 | End: 2023-09-02 | Stop reason: HOSPADM

## 2023-08-30 RX ORDER — INSULIN LISPRO 100 [IU]/ML
0-4 INJECTION, SOLUTION INTRAVENOUS; SUBCUTANEOUS NIGHTLY
Status: DISCONTINUED | OUTPATIENT
Start: 2023-08-30 | End: 2023-09-02 | Stop reason: HOSPADM

## 2023-08-30 RX ADMIN — ENOXAPARIN SODIUM 30 MG: 100 INJECTION SUBCUTANEOUS at 09:02

## 2023-08-30 RX ADMIN — ASPIRIN 81 MG 81 MG: 81 TABLET ORAL at 09:01

## 2023-08-30 RX ADMIN — CLOPIDOGREL BISULFATE 75 MG: 75 TABLET ORAL at 09:18

## 2023-08-30 RX ADMIN — INSULIN LISPRO 3 UNITS: 100 INJECTION, SOLUTION INTRAVENOUS; SUBCUTANEOUS at 11:40

## 2023-08-30 RX ADMIN — LORAZEPAM 1 MG: 2 INJECTION INTRAMUSCULAR; INTRAVENOUS at 17:21

## 2023-08-30 RX ADMIN — POTASSIUM CHLORIDE 10 MEQ: 7.46 INJECTION, SOLUTION INTRAVENOUS at 10:40

## 2023-08-30 RX ADMIN — INSULIN LISPRO 4 UNITS: 100 INJECTION, SOLUTION INTRAVENOUS; SUBCUTANEOUS at 16:17

## 2023-08-30 RX ADMIN — POTASSIUM CHLORIDE 10 MEQ: 7.46 INJECTION, SOLUTION INTRAVENOUS at 11:44

## 2023-08-30 RX ADMIN — INSULIN GLARGINE-YFGN 30 UNITS: 100 INJECTION, SOLUTION SUBCUTANEOUS at 09:01

## 2023-08-30 RX ADMIN — SODIUM CHLORIDE, PRESERVATIVE FREE 10 ML: 5 INJECTION INTRAVENOUS at 09:02

## 2023-08-30 RX ADMIN — AMLODIPINE BESYLATE 10 MG: 10 TABLET ORAL at 09:01

## 2023-08-30 RX ADMIN — PREGABALIN 25 MG: 25 CAPSULE ORAL at 09:01

## 2023-08-30 ASSESSMENT — PAIN SCALES - WONG BAKER: WONGBAKER_NUMERICALRESPONSE: 0

## 2023-08-30 NOTE — PROGRESS NOTES
Dr. Christal villalpando served regarding patient's history with claustrophobia with MRIs and asked for a dose of medication to assist

## 2023-08-30 NOTE — CARE COORDINATION
08/30/23 Update CM Note; Patient remains on telemetry. She remains neurologically the same. She was started on norvasc. She is now pending an MRI and echo. She is accepted at [facility] and precert is being started today. PT/OT 9/24. Will follow for further needs and completion of precert.  Electronically signed by Khushi Tubbs RN CM on 8/30/2023 at 9:02 AM

## 2023-08-31 ENCOUNTER — APPOINTMENT (OUTPATIENT)
Dept: ULTRASOUND IMAGING | Age: 88
DRG: 064 | End: 2023-08-31
Payer: MEDICARE

## 2023-08-31 LAB
ANION GAP SERPL CALCULATED.3IONS-SCNC: 12 MMOL/L (ref 7–16)
BUN SERPL-MCNC: 23 MG/DL (ref 6–23)
CALCIUM SERPL-MCNC: 8.5 MG/DL (ref 8.6–10.2)
CHLORIDE SERPL-SCNC: 109 MMOL/L (ref 98–107)
CK SERPL-CCNC: 389 U/L (ref 20–180)
CO2 SERPL-SCNC: 23 MMOL/L (ref 22–29)
CREAT SERPL-MCNC: 1.2 MG/DL (ref 0.5–1)
ERYTHROCYTE [DISTWIDTH] IN BLOOD BY AUTOMATED COUNT: 13.8 % (ref 11.5–15)
GFR SERPL CREATININE-BSD FRML MDRD: 43 ML/MIN/1.73M2
GLUCOSE BLD-MCNC: 159 MG/DL (ref 74–99)
GLUCOSE BLD-MCNC: 178 MG/DL (ref 74–99)
GLUCOSE BLD-MCNC: 219 MG/DL (ref 74–99)
GLUCOSE BLD-MCNC: 234 MG/DL (ref 74–99)
GLUCOSE SERPL-MCNC: 167 MG/DL (ref 74–99)
HCT VFR BLD AUTO: 35.5 % (ref 34–48)
HGB BLD-MCNC: 11.1 G/DL (ref 11.5–15.5)
LV EF: 65 %
LVEF MODALITY: NORMAL
MCH RBC QN AUTO: 32.2 PG (ref 26–35)
MCHC RBC AUTO-ENTMCNC: 31.3 G/DL (ref 32–34.5)
MCV RBC AUTO: 102.9 FL (ref 80–99.9)
MICROORGANISM SPEC CULT: ABNORMAL
MICROORGANISM/AGENT SPEC: ABNORMAL
PLATELET # BLD AUTO: 193 K/UL (ref 130–450)
PMV BLD AUTO: 10.3 FL (ref 7–12)
POTASSIUM SERPL-SCNC: 3.5 MMOL/L (ref 3.5–5)
RBC # BLD AUTO: 3.45 M/UL (ref 3.5–5.5)
SERVICE CMNT-IMP: ABNORMAL
SODIUM SERPL-SCNC: 144 MMOL/L (ref 132–146)
SPECIMEN DESCRIPTION: ABNORMAL
WBC OTHER # BLD: 6.5 K/UL (ref 4.5–11.5)

## 2023-08-31 PROCEDURE — 97535 SELF CARE MNGMENT TRAINING: CPT

## 2023-08-31 PROCEDURE — 82962 GLUCOSE BLOOD TEST: CPT

## 2023-08-31 PROCEDURE — 93880 EXTRACRANIAL BILAT STUDY: CPT

## 2023-08-31 PROCEDURE — 6360000004 HC RX CONTRAST MEDICATION: Performed by: NURSE PRACTITIONER

## 2023-08-31 PROCEDURE — S5553 INSULIN LONG ACTING 5 U: HCPCS | Performed by: FAMILY MEDICINE

## 2023-08-31 PROCEDURE — 93306 TTE W/DOPPLER COMPLETE: CPT

## 2023-08-31 PROCEDURE — 6370000000 HC RX 637 (ALT 250 FOR IP)

## 2023-08-31 PROCEDURE — 1200000000 HC SEMI PRIVATE

## 2023-08-31 PROCEDURE — 36415 COLL VENOUS BLD VENIPUNCTURE: CPT

## 2023-08-31 PROCEDURE — 97530 THERAPEUTIC ACTIVITIES: CPT

## 2023-08-31 PROCEDURE — 51798 US URINE CAPACITY MEASURE: CPT

## 2023-08-31 PROCEDURE — 80048 BASIC METABOLIC PNL TOTAL CA: CPT

## 2023-08-31 PROCEDURE — 6370000000 HC RX 637 (ALT 250 FOR IP): Performed by: FAMILY MEDICINE

## 2023-08-31 PROCEDURE — 6360000002 HC RX W HCPCS: Performed by: FAMILY MEDICINE

## 2023-08-31 PROCEDURE — 85027 COMPLETE CBC AUTOMATED: CPT

## 2023-08-31 PROCEDURE — 6370000000 HC RX 637 (ALT 250 FOR IP): Performed by: NURSE PRACTITIONER

## 2023-08-31 PROCEDURE — 2580000003 HC RX 258: Performed by: FAMILY MEDICINE

## 2023-08-31 PROCEDURE — 51701 INSERT BLADDER CATHETER: CPT

## 2023-08-31 PROCEDURE — 82550 ASSAY OF CK (CPK): CPT

## 2023-08-31 RX ORDER — POTASSIUM CHLORIDE 20 MEQ/1
20 TABLET, EXTENDED RELEASE ORAL ONCE
Status: COMPLETED | OUTPATIENT
Start: 2023-08-31 | End: 2023-08-31

## 2023-08-31 RX ORDER — HYDRALAZINE HYDROCHLORIDE 25 MG/1
25 TABLET, FILM COATED ORAL EVERY 8 HOURS SCHEDULED
Status: DISCONTINUED | OUTPATIENT
Start: 2023-08-31 | End: 2023-09-02 | Stop reason: HOSPADM

## 2023-08-31 RX ORDER — HYDRALAZINE HYDROCHLORIDE 20 MG/ML
10 INJECTION INTRAMUSCULAR; INTRAVENOUS ONCE
Status: COMPLETED | OUTPATIENT
Start: 2023-08-31 | End: 2023-08-31

## 2023-08-31 RX ADMIN — HYDRALAZINE HYDROCHLORIDE 25 MG: 25 TABLET, FILM COATED ORAL at 14:25

## 2023-08-31 RX ADMIN — SODIUM CHLORIDE, PRESERVATIVE FREE 10 ML: 5 INJECTION INTRAVENOUS at 08:33

## 2023-08-31 RX ADMIN — ATORVASTATIN CALCIUM 40 MG: 40 TABLET, FILM COATED ORAL at 21:11

## 2023-08-31 RX ADMIN — PERFLUTREN 1.5 ML: 6.52 INJECTION, SUSPENSION INTRAVENOUS at 16:02

## 2023-08-31 RX ADMIN — AMLODIPINE BESYLATE 10 MG: 10 TABLET ORAL at 08:33

## 2023-08-31 RX ADMIN — SODIUM CHLORIDE, PRESERVATIVE FREE 10 ML: 5 INJECTION INTRAVENOUS at 21:12

## 2023-08-31 RX ADMIN — HYDRALAZINE HYDROCHLORIDE 10 MG: 20 INJECTION, SOLUTION INTRAMUSCULAR; INTRAVENOUS at 05:26

## 2023-08-31 RX ADMIN — CLOPIDOGREL BISULFATE 75 MG: 75 TABLET ORAL at 08:33

## 2023-08-31 RX ADMIN — PREGABALIN 25 MG: 25 CAPSULE ORAL at 21:11

## 2023-08-31 RX ADMIN — PREGABALIN 25 MG: 25 CAPSULE ORAL at 08:33

## 2023-08-31 RX ADMIN — INSULIN LISPRO 2 UNITS: 100 INJECTION, SOLUTION INTRAVENOUS; SUBCUTANEOUS at 16:02

## 2023-08-31 RX ADMIN — HYDRALAZINE HYDROCHLORIDE 25 MG: 25 TABLET, FILM COATED ORAL at 21:11

## 2023-08-31 RX ADMIN — INSULIN GLARGINE-YFGN 30 UNITS: 100 INJECTION, SOLUTION SUBCUTANEOUS at 08:33

## 2023-08-31 RX ADMIN — ASPIRIN 81 MG 81 MG: 81 TABLET ORAL at 08:33

## 2023-08-31 RX ADMIN — POTASSIUM CHLORIDE 20 MEQ: 1500 TABLET, EXTENDED RELEASE ORAL at 09:55

## 2023-08-31 RX ADMIN — ENOXAPARIN SODIUM 30 MG: 100 INJECTION SUBCUTANEOUS at 08:32

## 2023-08-31 ASSESSMENT — PAIN SCALES - WONG BAKER
WONGBAKER_NUMERICALRESPONSE: 0
WONGBAKER_NUMERICALRESPONSE: 4

## 2023-08-31 NOTE — PLAN OF CARE
Neurology is following for acute stroke    Notes reviewed. MRI brain on 8/30 confirmed acute/early subacute infarcts in the left frontal lobe UDAY territory        Complete echo just done--not read as of yet with plans for Zio patch at discharge    Plan:  Continue supportive care  Better BP control--- normotensive  A1c 6.4, LDL 98  Await carotid ultrasound  Continue aspirin and Plavix  Continue high intensity statin  Continue telemetry    Discharge planned for today around 5 PM.  Neurology will sign off. Stroke clinic in 1 to 2 weeks.   Place ZIO prior to discharge

## 2023-08-31 NOTE — PROGRESS NOTES
Patient c/o pain to suprapubic area at this time. Noted distention. Bladder scanned per order and yates cath inserted per order for retention with immediate return of clear yellow urine. Urology consult sent to Dr Vinicio Mancuso and MD states will be in to see patient. Patient able to communicate relief of discomfort at this time.

## 2023-08-31 NOTE — PROGRESS NOTES
Physical Therapy  Treatment    Name: Radha Armenta  : 1934  MRN: [id number]      Date of Service: 2023    Evaluating PT: Sybil Soriano, PT, DPT [id number]      Room #:  8210/8210-A  Diagnosis:  Diverticulitis of colon [K57.32]  Altered mental status, unspecified altered mental status type [R41.82]  AMS (altered mental status) [R41.82]  PMHx/PSHx:   has a past medical history of Diabetes ([address]) and Hypertension. has a past surgical history that includes Hysterectomy. Procedure/Surgery:  None  Precautions:  Falls, aphasia, TSM  Equipment Needs:  TBD    SUBJECTIVE:    Pt was mostly nonverbal throughout the session. She was unable to communicate social history. Per chart, pt lives alone in an apartment. Pt ambulated with [facility] prior to admission. OBJECTIVE:   Initial Evaluation  Date: 23 Treatment Date:  2023 Short Term/ Long Term   Goals   AM-PAC 6 Clicks 3/73 01/09    Was pt agreeable to Eval/treatment? Yes Yes    Does pt have pain? No No complaints/signs of pain    Bed Mobility  Rolling: Mod A  Supine to sit: Max A  Sit to supine: Max A  Scooting: Max A to EOB Rolling: NT  Supine to sit: MaxA  Sit to supine: MaxA  Scooting: Aakash (seated) Rolling: CGA  Supine to sit: CGA  Sit to supine: CGA  Scooting: CGA   Transfers Sit to stand: Mod A x2  Stand to sit: Mod A x2   Stand pivot: NT Sit to stand: ModA x2  Stand to sit: ModA x2   Stand pivot: NT Sit to stand: CGA  Stand to sit: CGA  Stand pivot: CGA with [facility]   Ambulation   2-3 small side steps with [id number]-[id number] assist with Max A x2 2 feet (side steps) with no AD ModA x2    5 feet (forward/backward) with [facility] ModA x2 >50 feet with [facility] with CGA   Stair negotiation: ascended and descended NT NT >4 step(s) with 2 rail(s) with Min A   ROM BUE: Refer to OT note  BLE: WFL     Strength BUE: Refer to OT note  BLE: 4/5 grossly     Balance Sitting EOB: CGA  Dynamic Standing:  Mod A x2 with [id number] assist Sitting EOB: SBA  Dynamic Standing: ModA x2 with no AD /

## 2023-08-31 NOTE — CARE COORDINATION
8/31/23, SW spoke with Luan Connie from [facility] on precert has been obtained. Patient is set up for transport later today for a 5pm  to go to [facility]. Facility will transport by ambulette. Patient is pending an echo-department called. Patient will need ultrasound and possible zio patch upon discharge. Nursing and Orvil Prose son I room informed of  time for later today. PAS/RR, face sheet and envelope on soft chart. SW to follow.       Matheus Melchor, [facility] Case Management  [phone]

## 2023-08-31 NOTE — PROGRESS NOTES
independence  * Recommendation of environmental modifications for increased safety with functional transfers/mobility and ADLs  * Cognitive retraining/development of therapeutic activities to improve problem solving, judgement, memory, and attention for increased safety/participation in ADL/IADL tasks  * Therapeutic exercise to improve motor endurance, ROM, and functional strength for ADLs/functional transfers  * Therapeutic activities to facilitate/challenge dynamic balance, stand tolerance for increased safety and independence with ADLs  * Therapeutic activities to facilitate gross/fine motor skills for increased independence with ADLs        OTMRS  Modified [place] Scale (MRS)  Score     Description  0             No symptoms  1             No significant disability despite symptoms  2             Slight disability; able to look after own affairs  3             Moderate disability; able to ambulate without assist/ requires assist with ADLs  4             Moderate/Severe disability;requires assist to ambulate/assist with ADLs  5             Severe disability;bedridden/incontinent   6               Score:   4     Recommended Adaptive Equipment: TBD      Home Living: Pt lives alone in an apt     Equipment owned: walker     Prior Level of Function: unable to obtain ADL PLOF, assist with IADLs (cleaning lady and receives Meals on Wheels); ambulated w/ walker  Above information per chart. Unable to obtain from pt-family/caregiver not present     Pain Level: Pt did not complain of pain this session     Cognition: Alert w/ tactile and verbal stim.  Pt looks to name however nonverbal during session.; Follows 1 step directions inconsistently           Memory: unable to formally assess as pt nonverbal           Sequencing:  poor           Problem solving:  poor           Judgement/safety:  poor             Functional Assessment:  AM-PAC Daily Activity Raw Score: 10/24    Initial Eval Status  Date: 23 Treatment Status  Date: 8/31/23 STGs = LTGs  Time frame: 10-14 days   Feeding Moderate Assist   Chevy  Pt requiring assistance to open packages, arrange tray, cut food, pt sitting upright in bed to eat of lunch meal Supervision    Grooming Moderate Assist   Combed hair  Increased assist to initiate task - improved as progressed and once initiated  modA  Pt washed face, donned deodorant with cueing, assistance to comb hair seated EOB Supervision    UB Dressing Maximal Assist   modA  Ahmet/do hospital gown seated EOB Minimal Assist    LB Dressing Dependent  Dependent  Ahmet/do hospital socks  Moderate Assist    Bathing Dependent maxA  Pt able to wash of UB with cueing and increased time, assistance to wash of LB, buttocks and garo area  Moderate Assist    Toileting Dependent   Rolled L/R for hygiene task  Dependent  Pt incontinent of bowel, assistance with hygiene Moderate Assist    Bed Mobility  Supine to sit: Maximal Assist   Sit to supine: Maximal Assist   maxA  Supine<>EOB  EOB<>Supine Supine to sit: Minimal Assist   Sit to supine: Minimal Assist    Functional Transfers Moderate Assist x2  modAx2  Sit to Stand  Stand to Sit    Cueing for placement  Minimal Assist    Functional Mobility Maximal Assist x2  ~2 lateral sidesteps to [facility] w/ max cues and side by side assist. Difficulty advancing B LE's  modAx2  Pt took of short forward/backward steps with use of w.w. Moderate Assist    Balance Sitting:     Static:  Min A    Dynamic: Mod A  Standing:  Mod A-Max A x1-2  Sitting EOB:  SBA    Standing:  modAx2     Activity Tolerance Poor+ Poor+  Fair+   Visual/  Perceptual Glasses: no                          Hand Dominance R    AROM (PROM) Strength Additional Info:    RUE  WFL Distal: 3+/5  With increased cues good  and wfl FMC/dexterity noted during ADL tasks         LUE WFL Distal: 3+/5  With increased cues good  and wfl FMC/dexterity noted during ADL tasks         Hearing: Appears WFL  Sensation:  Unable to assess d/t

## 2023-08-31 NOTE — CARE COORDINATION
8/31/23, REESE Update-Transportation has been cancelled with Alex Choi from [facility]. Patient has not been medically cleared for discharge. Echo to be read and carotid ultrasound to be done. Patient to have Zio patch upon discharge. Call placed to ultrasound. PAS/RR, face sheet and envelope on soft chart. SW to follow.      Alber Dey, [facility] Case Management  [phone]

## 2023-08-31 NOTE — CARE COORDINATION
08/31/23 Update CM Note: Precert started yesterday for skilled at [facility]. MRI completed and revealed an acute/subacute infarction. Echo remains pending. She will need updated PT/OT today. Precert for skilled facility is completed. Plan is for discharge possible today. All necessary paperwork completed.   Electronically signed by Aileen Rucker RN CM on 8/31/2023 at 6:19 AM

## 2023-09-01 LAB
ANION GAP SERPL CALCULATED.3IONS-SCNC: 9 MMOL/L (ref 7–16)
BUN SERPL-MCNC: 22 MG/DL (ref 6–23)
CALCIUM SERPL-MCNC: 8.6 MG/DL (ref 8.6–10.2)
CHLORIDE SERPL-SCNC: 107 MMOL/L (ref 98–107)
CK SERPL-CCNC: 212 U/L (ref 20–180)
CO2 SERPL-SCNC: 23 MMOL/L (ref 22–29)
CREAT SERPL-MCNC: 1.3 MG/DL (ref 0.5–1)
ERYTHROCYTE [DISTWIDTH] IN BLOOD BY AUTOMATED COUNT: 13.7 % (ref 11.5–15)
GFR SERPL CREATININE-BSD FRML MDRD: 40 ML/MIN/1.73M2
GLUCOSE BLD-MCNC: 138 MG/DL (ref 74–99)
GLUCOSE BLD-MCNC: 186 MG/DL (ref 74–99)
GLUCOSE BLD-MCNC: 223 MG/DL (ref 74–99)
GLUCOSE BLD-MCNC: 260 MG/DL (ref 74–99)
GLUCOSE SERPL-MCNC: 130 MG/DL (ref 74–99)
HCT VFR BLD AUTO: 35.3 % (ref 34–48)
HGB BLD-MCNC: 10.8 G/DL (ref 11.5–15.5)
MCH RBC QN AUTO: 32 PG (ref 26–35)
MCHC RBC AUTO-ENTMCNC: 30.6 G/DL (ref 32–34.5)
MCV RBC AUTO: 104.7 FL (ref 80–99.9)
PLATELET # BLD AUTO: 202 K/UL (ref 130–450)
PMV BLD AUTO: 10.3 FL (ref 7–12)
POTASSIUM SERPL-SCNC: 3.6 MMOL/L (ref 3.5–5)
RBC # BLD AUTO: 3.37 M/UL (ref 3.5–5.5)
SODIUM SERPL-SCNC: 139 MMOL/L (ref 132–146)
WBC OTHER # BLD: 6.7 K/UL (ref 4.5–11.5)

## 2023-09-01 PROCEDURE — 82550 ASSAY OF CK (CPK): CPT

## 2023-09-01 PROCEDURE — 6370000000 HC RX 637 (ALT 250 FOR IP)

## 2023-09-01 PROCEDURE — 6360000002 HC RX W HCPCS: Performed by: FAMILY MEDICINE

## 2023-09-01 PROCEDURE — 80048 BASIC METABOLIC PNL TOTAL CA: CPT

## 2023-09-01 PROCEDURE — 85027 COMPLETE CBC AUTOMATED: CPT

## 2023-09-01 PROCEDURE — S5553 INSULIN LONG ACTING 5 U: HCPCS | Performed by: FAMILY MEDICINE

## 2023-09-01 PROCEDURE — 36415 COLL VENOUS BLD VENIPUNCTURE: CPT

## 2023-09-01 PROCEDURE — 2580000003 HC RX 258: Performed by: FAMILY MEDICINE

## 2023-09-01 PROCEDURE — 82962 GLUCOSE BLOOD TEST: CPT

## 2023-09-01 PROCEDURE — 1200000000 HC SEMI PRIVATE

## 2023-09-01 PROCEDURE — 6370000000 HC RX 637 (ALT 250 FOR IP): Performed by: NURSE PRACTITIONER

## 2023-09-01 PROCEDURE — 6370000000 HC RX 637 (ALT 250 FOR IP): Performed by: FAMILY MEDICINE

## 2023-09-01 RX ORDER — HYDRALAZINE HYDROCHLORIDE 25 MG/1
25 TABLET, FILM COATED ORAL EVERY 8 HOURS SCHEDULED
Qty: 90 TABLET | Refills: 0 | Status: ON HOLD | DISCHARGE
Start: 2023-09-01

## 2023-09-01 RX ORDER — ATORVASTATIN CALCIUM 40 MG/1
40 TABLET, FILM COATED ORAL NIGHTLY
Qty: 30 TABLET | Refills: 0 | Status: ON HOLD | OUTPATIENT
Start: 2023-09-01

## 2023-09-01 RX ORDER — PREGABALIN 25 MG/1
25 CAPSULE ORAL 3 TIMES DAILY
Qty: 90 CAPSULE | Refills: 0 | Status: SHIPPED | OUTPATIENT
Start: 2023-09-01 | End: 2023-09-01 | Stop reason: SDUPTHER

## 2023-09-01 RX ORDER — PREGABALIN 25 MG/1
25 CAPSULE ORAL 3 TIMES DAILY
Qty: 90 CAPSULE | Refills: 0 | Status: ON HOLD | OUTPATIENT
Start: 2023-09-01 | End: 2023-10-01

## 2023-09-01 RX ADMIN — AMLODIPINE BESYLATE 10 MG: 10 TABLET ORAL at 08:10

## 2023-09-01 RX ADMIN — HYDRALAZINE HYDROCHLORIDE 25 MG: 25 TABLET, FILM COATED ORAL at 15:11

## 2023-09-01 RX ADMIN — SODIUM CHLORIDE, PRESERVATIVE FREE 10 ML: 5 INJECTION INTRAVENOUS at 08:10

## 2023-09-01 RX ADMIN — ENOXAPARIN SODIUM 30 MG: 100 INJECTION SUBCUTANEOUS at 08:10

## 2023-09-01 RX ADMIN — INSULIN LISPRO 2 UNITS: 100 INJECTION, SOLUTION INTRAVENOUS; SUBCUTANEOUS at 17:20

## 2023-09-01 RX ADMIN — HYDRALAZINE HYDROCHLORIDE 25 MG: 25 TABLET, FILM COATED ORAL at 20:41

## 2023-09-01 RX ADMIN — CLOPIDOGREL BISULFATE 75 MG: 75 TABLET ORAL at 08:10

## 2023-09-01 RX ADMIN — ASPIRIN 81 MG 81 MG: 81 TABLET ORAL at 08:10

## 2023-09-01 RX ADMIN — ATORVASTATIN CALCIUM 40 MG: 40 TABLET, FILM COATED ORAL at 20:41

## 2023-09-01 RX ADMIN — PREGABALIN 25 MG: 25 CAPSULE ORAL at 08:10

## 2023-09-01 RX ADMIN — HYDRALAZINE HYDROCHLORIDE 25 MG: 25 TABLET, FILM COATED ORAL at 06:10

## 2023-09-01 RX ADMIN — PREGABALIN 25 MG: 25 CAPSULE ORAL at 20:41

## 2023-09-01 RX ADMIN — INSULIN LISPRO 4 UNITS: 100 INJECTION, SOLUTION INTRAVENOUS; SUBCUTANEOUS at 11:38

## 2023-09-01 RX ADMIN — INSULIN GLARGINE-YFGN 30 UNITS: 100 INJECTION, SOLUTION SUBCUTANEOUS at 08:10

## 2023-09-01 NOTE — DISCHARGE INSTRUCTIONS
Place the patients yates to leg bag on discharge from the hospital.  Please give the patient a night bag (large bag) and yates instructions upon discharge. Please have the patient contact the office for yates removal instructions. The catheter may go red (hematuria), may go clear, and may go red. This is a normal process, as long as the catheter is draining. Call the office if any concerns should arise for further instructions, [phone]. Call upon discharge to schedule a follow-up visit with Yon Lemon/Lyla/Karrie ([facility]) at [phone] About Indwelling Urinary Catheter Care to Prevent Infection  Overview     A urinary catheter is a flexible plastic tube that's used to drain urine from your bladder when you can't urinate on your own. The catheter allows urine to drain from the bladder into a bag. Two types of drainage bags may be used with a urinary catheter. A bedside bag is a large bag that you can hang on the side of your bed or on a chair. You can use it overnight or anytime you will be sitting or lying down for a long time. A leg bag is a small bag that you can use during the day. It is usually attached to your thigh or calf and hidden under your clothes. Having a urinary catheter increases your risk of getting a urinary tract infection. Germs may get on the catheter and cause an infection in your bladder or kidneys. The longer you have a catheter, the more likely it is that you will get an infection. You can help prevent this problem with good hygiene and careful handling of your catheter and drainage bags. How can you help prevent infection? Take care to stay clean  Always wash your hands well before and after you handle your catheter. Clean the skin around the catheter daily using soap and water. Dry with a clean towel afterward. You can shower with your catheter and drainage bag in place unless your doctor told you not to.   When you clean around the catheter,

## 2023-09-01 NOTE — DISCHARGE SUMMARY
Hospitalist Discharge Summary    Patient ID: Luis E Javy   Patient : 1934  Patient's PCP: Arianne Babin DO    Admit Date: 2023   Admitting Physician: Petra Hardin MD    Discharge Date:  2023   Discharge Physician: HOANG Duckworth - NP   Discharge Condition: Stable  Discharge Disposition: [address] in brief:  (Please refer to daily progress notes for a comprehensive review of the hospitalization by requesting medical records)    Patient admitted for AMS. Pt presented to the ED after being found unresponsive wedged between her bed and the wall during a wellness check. LKW 6 pm the night prior to arrival.  In ED vitals were stable. Laboratory work-up significant for normal anion gap metabolic acidosis which has resolved, renal insufficiency at baseline, elevated troponin, and macrocytic anemia. UA without evidence of UTI. CT head without acute infarcts but with an old lacunar infarct. CT of the AP with evidence of mild, uncomplicated acute diverticulitis and CHF exacerbation with a trace pericardial and left pleural effusions with interstitial edema. She was admitted with c/s to neurology. MRI revealed acute vs early subacute infarctions in the L frontal lobe. Echo and carotid US were unrevealing. Neuro has s/o with recs for Zio patch at discharge. She is now stable for discharge to [facility] with OP follow up.        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  Hx aphasia   Mild uncomplicated diverticulitis  Suspected acute CHF  Pulm edema  Trace pericardial effusion  Trace L pleural effusion  Elevated troponin  Bacteremia - likely contaminate  Hypokalemia - RESOLVED  Macrocytic anemia  Hx CVA noted on CT  CKD III, baseline cr appears to be around 1.3-1.4 - follows with Dr Kristi Ledbetter  IDDM2  HTN  Anxiety/depression      Discharge tablet  Commonly known as: ATIVAN     traMADol 50 MG tablet  Commonly known as: ULTRAM               Where to Get Your Medications        [facility] [address], [facility] [place] [address]      Phone: [phone]   atorvastatin 40 MG tablet       Information about where to get these medications is not yet available    Ask your nurse or doctor about these medications  hydrALAZINE 25 MG tablet         Time Spent on discharge is more than 45 minutes in the examination, evaluation, counseling and review of medications and discharge plan.    +++++++++++++++++++++++++++++++++++++++++++++++++  HOANG Carrera - DAVID  [place], [place]  +++++++++++++++++++++++++++++++++++++++++++++++++  NOTE: This report was transcribed using voice recognition software. Every effort was made to ensure accuracy; however, inadvertent computerized transcription errors may be present.

## 2023-09-01 NOTE — CONSULTS
9/1/2023 11:57 AM  Prudence Toledo  [id number]     Chief Complaint:    Urinary retention      History of Present Illness: The patient is a 80 y.o. female patient who presented to the hospital with complaints of altered mental status. She was admitted for acute stroke. Urology was asked to evaluate for urinary retention. She had a yates catheter inserted yesterday for urinary retention. She is being discharged to a facility today. She is a poor historian. There is no family present. Past Medical History:   Diagnosis Date    Diabetes ([address])     Hypertension          Past Surgical History:   Procedure Laterality Date    HYSTERECTOMY (CERVIX STATUS UNKNOWN)         Medications Prior to Admission:    Medications Prior to Admission: clopidogrel (PLAVIX) 75 MG tablet, Take 1 tablet by mouth daily  insulin lispro (HUMALOG) 100 UNIT/ML injection vial, Inject 0-10 Units into the skin 3 times daily (before meals) *Per Sliding Scale*  pregabalin (LYRICA) 25 MG capsule, Take 1 capsule by mouth 3 times daily. [DISCONTINUED] aspirin-acetaminophen-caffeine (EXCEDRIN MIGRAINE) 250-250-65 MG per tablet, Take 1 tablet by mouth every 6 hours as needed for Headaches  [DISCONTINUED] traMADol (ULTRAM) 50 MG tablet, Take 1 tablet by mouth every 6 hours as needed for Pain. [DISCONTINUED] nortriptyline (PAMELOR) 10 MG capsule, Take 1 capsule by mouth nightly  aspirin 81 MG EC tablet, Take 1 tablet by mouth daily  amLODIPine (NORVASC) 5 MG tablet, Take 1 tablet by mouth daily  insulin glargine (LANTUS SOLOSTAR) 100 UNIT/ML injection pen, Inject 30 Units into the skin daily  [DISCONTINUED] amLODIPine (NORVASC) 5 MG tablet,   [DISCONTINUED] Al Hyd-Mg Tr-Alg Ac-Sod Bicarb (GAVISCON-2 PO),   [DISCONTINUED] LORazepam (ATIVAN) 0.5 MG tablet, Take 1 tablet by mouth every 12 hours as needed for Anxiety.   [DISCONTINUED] fluticasone (FLONASE) 50 MCG/ACT nasal spray,   [DISCONTINUED] glipiZIDE (GLUCOTROL XL) 10 MG extended release tablet, As 11:57 AM  [facility]     Agree with above  Seen and examined  Agree with the plan and treatment    [facility], DO

## 2023-09-01 NOTE — CARE COORDINATION
09/01/23 CM update:  Pt has been discharged   Notified Rosa Santos at [facility] of transport to facility [facility] called for transport given 2:00pm pickup for transport time. Son Alvin Samayoa also notified of transport to facilty. All necessary paperwork completed.  Electronically signed by Yamilet Caicedo RN CM on 9/1/2023 at 3:15 PM

## 2023-09-02 VITALS
HEART RATE: 84 BPM | BODY MASS INDEX: 25.52 KG/M2 | RESPIRATION RATE: 18 BRPM | HEIGHT: 60 IN | TEMPERATURE: 97.3 F | SYSTOLIC BLOOD PRESSURE: 175 MMHG | WEIGHT: 130 LBS | DIASTOLIC BLOOD PRESSURE: 49 MMHG | OXYGEN SATURATION: 96 %

## 2023-09-02 LAB
MICROORGANISM SPEC CULT: NORMAL
SERVICE CMNT-IMP: NORMAL
SPECIMEN DESCRIPTION: NORMAL

## 2023-09-02 PROCEDURE — 93246 EXT ECG>7D<15D RECORDING: CPT

## 2023-09-02 NOTE — PROGRESS NOTES
Honorioo AT placed and registered by floor RNs. [facility] serial number is [id number].  Device is registered and transmitting to o suite

## 2023-09-03 LAB
MICROORGANISM SPEC CULT: NORMAL
MICROORGANISM SPEC CULT: NORMAL
SERVICE CMNT-IMP: NORMAL
SERVICE CMNT-IMP: NORMAL
SPECIMEN DESCRIPTION: NORMAL
SPECIMEN DESCRIPTION: NORMAL

## 2023-09-09 ENCOUNTER — APPOINTMENT (OUTPATIENT)
Dept: GENERAL RADIOLOGY | Age: 88
End: 2023-09-09
Payer: MEDICARE

## 2023-09-09 ENCOUNTER — HOSPITAL ENCOUNTER (INPATIENT)
Age: 88
LOS: 4 days | Discharge: HOSPICE | End: 2023-09-14
Attending: EMERGENCY MEDICINE | Admitting: FAMILY MEDICINE
Payer: MEDICARE

## 2023-09-09 ENCOUNTER — APPOINTMENT (OUTPATIENT)
Dept: CT IMAGING | Age: 88
End: 2023-09-09
Payer: MEDICARE

## 2023-09-09 DIAGNOSIS — R41.82 ALTERED MENTAL STATUS, UNSPECIFIED ALTERED MENTAL STATUS TYPE: Primary | ICD-10-CM

## 2023-09-09 DIAGNOSIS — K52.9 COLITIS: ICD-10-CM

## 2023-09-09 DIAGNOSIS — Z51.5 PALLIATIVE CARE ENCOUNTER: ICD-10-CM

## 2023-09-09 DIAGNOSIS — J90 PLEURAL EFFUSION, LEFT: ICD-10-CM

## 2023-09-09 LAB
ALBUMIN SERPL-MCNC: 3.5 G/DL (ref 3.5–5.2)
ALP SERPL-CCNC: 74 U/L (ref 35–104)
ALT SERPL-CCNC: 45 U/L (ref 0–32)
ANION GAP SERPL CALCULATED.3IONS-SCNC: 12 MMOL/L (ref 7–16)
AST SERPL-CCNC: 42 U/L (ref 0–31)
BILIRUB SERPL-MCNC: 0.4 MG/DL (ref 0–1.2)
BUN SERPL-MCNC: 47 MG/DL (ref 6–23)
CALCIUM SERPL-MCNC: 8.3 MG/DL (ref 8.6–10.2)
CHLORIDE SERPL-SCNC: 112 MMOL/L (ref 98–107)
CO2 SERPL-SCNC: 20 MMOL/L (ref 22–29)
CREAT SERPL-MCNC: 1.4 MG/DL (ref 0.5–1)
GFR SERPL CREATININE-BSD FRML MDRD: 35 ML/MIN/1.73M2
GLUCOSE SERPL-MCNC: 252 MG/DL (ref 74–99)
LACTATE BLDV-SCNC: 1.2 MMOL/L (ref 0.5–2.2)
LIPASE SERPL-CCNC: 11 U/L (ref 13–60)
POTASSIUM SERPL-SCNC: 4.3 MMOL/L (ref 3.5–5)
PROT SERPL-MCNC: 6.8 G/DL (ref 6.4–8.3)
SODIUM SERPL-SCNC: 144 MMOL/L (ref 132–146)
TROPONIN I SERPL HS-MCNC: 56 NG/L (ref 0–9)

## 2023-09-09 PROCEDURE — 71045 X-RAY EXAM CHEST 1 VIEW: CPT

## 2023-09-09 PROCEDURE — 87086 URINE CULTURE/COLONY COUNT: CPT

## 2023-09-09 PROCEDURE — 81001 URINALYSIS AUTO W/SCOPE: CPT

## 2023-09-09 PROCEDURE — 85025 COMPLETE CBC W/AUTO DIFF WBC: CPT

## 2023-09-09 PROCEDURE — 83605 ASSAY OF LACTIC ACID: CPT

## 2023-09-09 PROCEDURE — 99285 EMERGENCY DEPT VISIT HI MDM: CPT

## 2023-09-09 PROCEDURE — 80053 COMPREHEN METABOLIC PANEL: CPT

## 2023-09-09 PROCEDURE — 83690 ASSAY OF LIPASE: CPT

## 2023-09-09 PROCEDURE — 84484 ASSAY OF TROPONIN QUANT: CPT

## 2023-09-09 PROCEDURE — 93005 ELECTROCARDIOGRAM TRACING: CPT | Performed by: STUDENT IN AN ORGANIZED HEALTH CARE EDUCATION/TRAINING PROGRAM

## 2023-09-09 RX ORDER — 0.9 % SODIUM CHLORIDE 0.9 %
1000 INTRAVENOUS SOLUTION INTRAVENOUS ONCE
Status: COMPLETED | OUTPATIENT
Start: 2023-09-10 | End: 2023-09-10

## 2023-09-10 ENCOUNTER — APPOINTMENT (OUTPATIENT)
Dept: CT IMAGING | Age: 88
End: 2023-09-10
Payer: MEDICARE

## 2023-09-10 LAB
AMORPH SED URNS QL MICRO: PRESENT
B.E.: -4.9 MMOL/L (ref -3–3)
BACTERIA URNS QL MICRO: ABNORMAL
BASOPHILS # BLD: 0 K/UL (ref 0–0.2)
BASOPHILS NFR BLD: 0 % (ref 0–2)
BILIRUB UR QL STRIP: NEGATIVE
CLARITY UR: CLEAR
COHB: 0.2 % (ref 0–1.5)
COLOR UR: YELLOW
CRITICAL: ABNORMAL
CRYSTALS URNS MICRO: ABNORMAL /HPF
DATE ANALYZED: ABNORMAL
DATE OF COLLECTION: ABNORMAL
EOSINOPHIL # BLD: 0 K/UL (ref 0.05–0.5)
EOSINOPHILS RELATIVE PERCENT: 0 % (ref 0–6)
ERYTHROCYTE [DISTWIDTH] IN BLOOD BY AUTOMATED COUNT: 13.2 % (ref 11.5–15)
GLUCOSE BLD-MCNC: 205 MG/DL (ref 74–99)
GLUCOSE BLD-MCNC: 244 MG/DL (ref 74–99)
GLUCOSE BLD-MCNC: 307 MG/DL (ref 74–99)
GLUCOSE UR STRIP-MCNC: 250 MG/DL
HCO3: 19 MMOL/L (ref 22–26)
HCT VFR BLD AUTO: 37.5 % (ref 34–48)
HGB BLD-MCNC: 11.3 G/DL (ref 11.5–15.5)
HGB UR QL STRIP.AUTO: ABNORMAL
HHB: 2.7 % (ref 0–5)
KETONES UR STRIP-MCNC: NEGATIVE MG/DL
LAB: ABNORMAL
LEUKOCYTE ESTERASE UR QL STRIP: ABNORMAL
LYMPHOCYTES NFR BLD: 0.31 K/UL (ref 1.5–4)
LYMPHOCYTES RELATIVE PERCENT: 3 % (ref 20–42)
Lab: ABNORMAL
MCH RBC QN AUTO: 31.3 PG (ref 26–35)
MCHC RBC AUTO-ENTMCNC: 30.1 G/DL (ref 32–34.5)
MCV RBC AUTO: 103.9 FL (ref 80–99.9)
METHB: 0.3 % (ref 0–1.5)
MODE: ABNORMAL
MONOCYTES NFR BLD: 0.71 K/UL (ref 0.1–0.95)
MONOCYTES NFR BLD: 7 % (ref 2–12)
NEUTROPHILS NFR BLD: 90 % (ref 43–80)
NEUTS SEG NFR BLD: 9.18 K/UL (ref 1.8–7.3)
NITRITE UR QL STRIP: NEGATIVE
O2 CONTENT: 15.9 ML/DL
O2 SATURATION: 97.3 % (ref 92–98.5)
O2HB: 96.8 % (ref 94–97)
OPERATOR ID: 7296
PATIENT TEMP: 37 C
PCO2: 31.5 MMHG (ref 35–45)
PH BLOOD GAS: 7.4 (ref 7.35–7.45)
PH UR STRIP: 5.5 [PH] (ref 5–9)
PLATELET # BLD AUTO: 170 K/UL (ref 130–450)
PMV BLD AUTO: 11.4 FL (ref 7–12)
PO2: 103.6 MMHG (ref 75–100)
PROT UR STRIP-MCNC: 100 MG/DL
RBC # BLD AUTO: 3.61 M/UL (ref 3.5–5.5)
RBC # BLD: ABNORMAL 10*6/UL
RBC # BLD: ABNORMAL 10*6/UL
RBC #/AREA URNS HPF: ABNORMAL /HPF
SOURCE, BLOOD GAS: ABNORMAL
SP GR UR STRIP: 1.02 (ref 1–1.03)
THB: 11.6 G/DL (ref 11.5–16.5)
TIME ANALYZED: 18
TROPONIN I SERPL HS-MCNC: 55 NG/L (ref 0–9)
UROBILINOGEN UR STRIP-ACNC: 0.2 EU/DL (ref 0–1)
WBC #/AREA URNS HPF: ABNORMAL /HPF
WBC OTHER # BLD: 10.2 K/UL (ref 4.5–11.5)

## 2023-09-10 PROCEDURE — 2700000000 HC OXYGEN THERAPY PER DAY

## 2023-09-10 PROCEDURE — 6360000004 HC RX CONTRAST MEDICATION: Performed by: RADIOLOGY

## 2023-09-10 PROCEDURE — 36600 WITHDRAWAL OF ARTERIAL BLOOD: CPT

## 2023-09-10 PROCEDURE — 74177 CT ABD & PELVIS W/CONTRAST: CPT

## 2023-09-10 PROCEDURE — 6370000000 HC RX 637 (ALT 250 FOR IP): Performed by: STUDENT IN AN ORGANIZED HEALTH CARE EDUCATION/TRAINING PROGRAM

## 2023-09-10 PROCEDURE — 6360000002 HC RX W HCPCS: Performed by: FAMILY MEDICINE

## 2023-09-10 PROCEDURE — S5553 INSULIN LONG ACTING 5 U: HCPCS | Performed by: STUDENT IN AN ORGANIZED HEALTH CARE EDUCATION/TRAINING PROGRAM

## 2023-09-10 PROCEDURE — 2580000003 HC RX 258: Performed by: FAMILY MEDICINE

## 2023-09-10 PROCEDURE — 2580000003 HC RX 258: Performed by: STUDENT IN AN ORGANIZED HEALTH CARE EDUCATION/TRAINING PROGRAM

## 2023-09-10 PROCEDURE — 84484 ASSAY OF TROPONIN QUANT: CPT

## 2023-09-10 PROCEDURE — 2060000000 HC ICU INTERMEDIATE R&B

## 2023-09-10 PROCEDURE — 70450 CT HEAD/BRAIN W/O DYE: CPT

## 2023-09-10 PROCEDURE — 82805 BLOOD GASES W/O2 SATURATION: CPT

## 2023-09-10 PROCEDURE — 82962 GLUCOSE BLOOD TEST: CPT

## 2023-09-10 RX ORDER — LEVOTHYROXINE SODIUM 0.03 MG/1
25 TABLET ORAL DAILY
Status: DISCONTINUED | OUTPATIENT
Start: 2023-09-10 | End: 2023-09-14 | Stop reason: HOSPADM

## 2023-09-10 RX ORDER — ZIPRASIDONE MESYLATE 20 MG/ML
10 INJECTION, POWDER, LYOPHILIZED, FOR SOLUTION INTRAMUSCULAR ONCE
Status: DISCONTINUED | OUTPATIENT
Start: 2023-09-10 | End: 2023-09-10

## 2023-09-10 RX ORDER — PROMETHAZINE HYDROCHLORIDE 12.5 MG/1
12.5 TABLET ORAL EVERY 6 HOURS PRN
Status: DISCONTINUED | OUTPATIENT
Start: 2023-09-10 | End: 2023-09-14 | Stop reason: HOSPADM

## 2023-09-10 RX ORDER — METRONIDAZOLE 500 MG/100ML
500 INJECTION, SOLUTION INTRAVENOUS ONCE
Status: DISCONTINUED | OUTPATIENT
Start: 2023-09-10 | End: 2023-09-10

## 2023-09-10 RX ORDER — ATORVASTATIN CALCIUM 40 MG/1
40 TABLET, FILM COATED ORAL NIGHTLY
Status: DISCONTINUED | OUTPATIENT
Start: 2023-09-10 | End: 2023-09-14 | Stop reason: HOSPADM

## 2023-09-10 RX ORDER — SODIUM CHLORIDE 0.9 % (FLUSH) 0.9 %
10 SYRINGE (ML) INJECTION PRN
Status: DISCONTINUED | OUTPATIENT
Start: 2023-09-10 | End: 2023-09-14 | Stop reason: HOSPADM

## 2023-09-10 RX ORDER — LEVOTHYROXINE SODIUM 0.03 MG/1
25 TABLET ORAL DAILY
COMMUNITY

## 2023-09-10 RX ORDER — SODIUM CHLORIDE 0.9 % (FLUSH) 0.9 %
10 SYRINGE (ML) INJECTION EVERY 12 HOURS SCHEDULED
Status: DISCONTINUED | OUTPATIENT
Start: 2023-09-10 | End: 2023-09-14 | Stop reason: HOSPADM

## 2023-09-10 RX ORDER — INSULIN LISPRO 100 [IU]/ML
0-8 INJECTION, SOLUTION INTRAVENOUS; SUBCUTANEOUS
Status: DISCONTINUED | OUTPATIENT
Start: 2023-09-10 | End: 2023-09-14 | Stop reason: HOSPADM

## 2023-09-10 RX ORDER — DEXTROSE MONOHYDRATE 100 MG/ML
INJECTION, SOLUTION INTRAVENOUS CONTINUOUS PRN
Status: DISCONTINUED | OUTPATIENT
Start: 2023-09-10 | End: 2023-09-14 | Stop reason: HOSPADM

## 2023-09-10 RX ORDER — ACETAMINOPHEN 325 MG/1
650 TABLET ORAL EVERY 6 HOURS PRN
Status: DISCONTINUED | OUTPATIENT
Start: 2023-09-10 | End: 2023-09-14 | Stop reason: HOSPADM

## 2023-09-10 RX ORDER — ONDANSETRON 2 MG/ML
4 INJECTION INTRAMUSCULAR; INTRAVENOUS EVERY 6 HOURS PRN
Status: DISCONTINUED | OUTPATIENT
Start: 2023-09-10 | End: 2023-09-14 | Stop reason: HOSPADM

## 2023-09-10 RX ORDER — METRONIDAZOLE 500 MG/100ML
500 INJECTION, SOLUTION INTRAVENOUS EVERY 8 HOURS
Status: DISCONTINUED | OUTPATIENT
Start: 2023-09-10 | End: 2023-09-14 | Stop reason: HOSPADM

## 2023-09-10 RX ORDER — CLOPIDOGREL BISULFATE 75 MG/1
75 TABLET ORAL DAILY
Status: DISCONTINUED | OUTPATIENT
Start: 2023-09-10 | End: 2023-09-14 | Stop reason: HOSPADM

## 2023-09-10 RX ORDER — INSULIN GLARGINE-YFGN 100 [IU]/ML
30 INJECTION, SOLUTION SUBCUTANEOUS DAILY
Status: DISCONTINUED | OUTPATIENT
Start: 2023-09-10 | End: 2023-09-11

## 2023-09-10 RX ORDER — ACETAMINOPHEN 650 MG/1
650 SUPPOSITORY RECTAL EVERY 6 HOURS PRN
Status: DISCONTINUED | OUTPATIENT
Start: 2023-09-10 | End: 2023-09-14 | Stop reason: HOSPADM

## 2023-09-10 RX ORDER — AMLODIPINE BESYLATE 5 MG/1
5 TABLET ORAL DAILY
Status: DISCONTINUED | OUTPATIENT
Start: 2023-09-10 | End: 2023-09-11

## 2023-09-10 RX ORDER — SODIUM CHLORIDE 9 MG/ML
INJECTION, SOLUTION INTRAVENOUS PRN
Status: DISCONTINUED | OUTPATIENT
Start: 2023-09-10 | End: 2023-09-14 | Stop reason: HOSPADM

## 2023-09-10 RX ORDER — ASPIRIN 81 MG/1
81 TABLET ORAL DAILY
Status: DISCONTINUED | OUTPATIENT
Start: 2023-09-10 | End: 2023-09-12 | Stop reason: ALTCHOICE

## 2023-09-10 RX ORDER — ENOXAPARIN SODIUM 100 MG/ML
30 INJECTION SUBCUTANEOUS DAILY
Status: DISCONTINUED | OUTPATIENT
Start: 2023-09-10 | End: 2023-09-14 | Stop reason: HOSPADM

## 2023-09-10 RX ORDER — POLYETHYLENE GLYCOL 3350 17 G/17G
17 POWDER, FOR SOLUTION ORAL DAILY PRN
Status: DISCONTINUED | OUTPATIENT
Start: 2023-09-10 | End: 2023-09-14 | Stop reason: HOSPADM

## 2023-09-10 RX ORDER — HYDRALAZINE HYDROCHLORIDE 25 MG/1
25 TABLET, FILM COATED ORAL EVERY 8 HOURS SCHEDULED
Status: DISCONTINUED | OUTPATIENT
Start: 2023-09-10 | End: 2023-09-13

## 2023-09-10 RX ORDER — SODIUM CHLORIDE 9 MG/ML
INJECTION, SOLUTION INTRAVENOUS CONTINUOUS
Status: DISCONTINUED | OUTPATIENT
Start: 2023-09-10 | End: 2023-09-11

## 2023-09-10 RX ORDER — INSULIN LISPRO 100 [IU]/ML
0-4 INJECTION, SOLUTION INTRAVENOUS; SUBCUTANEOUS NIGHTLY
Status: DISCONTINUED | OUTPATIENT
Start: 2023-09-10 | End: 2023-09-14 | Stop reason: HOSPADM

## 2023-09-10 RX ADMIN — METRONIDAZOLE 500 MG: 500 INJECTION, SOLUTION INTRAVENOUS at 13:44

## 2023-09-10 RX ADMIN — ASPIRIN 81 MG: 81 TABLET, COATED ORAL at 17:14

## 2023-09-10 RX ADMIN — METRONIDAZOLE 500 MG: 500 INJECTION, SOLUTION INTRAVENOUS at 02:50

## 2023-09-10 RX ADMIN — INSULIN GLARGINE-YFGN 30 UNITS: 100 INJECTION, SOLUTION SUBCUTANEOUS at 17:15

## 2023-09-10 RX ADMIN — HYDRALAZINE HYDROCHLORIDE 25 MG: 25 TABLET, FILM COATED ORAL at 17:14

## 2023-09-10 RX ADMIN — INSULIN LISPRO 2 UNITS: 100 INJECTION, SOLUTION INTRAVENOUS; SUBCUTANEOUS at 17:33

## 2023-09-10 RX ADMIN — CLOPIDOGREL BISULFATE 75 MG: 75 TABLET ORAL at 17:14

## 2023-09-10 RX ADMIN — LEVOTHYROXINE SODIUM 25 MCG: 0.03 TABLET ORAL at 17:48

## 2023-09-10 RX ADMIN — ATORVASTATIN CALCIUM 40 MG: 40 TABLET, FILM COATED ORAL at 20:41

## 2023-09-10 RX ADMIN — ENOXAPARIN SODIUM 30 MG: 100 INJECTION SUBCUTANEOUS at 13:50

## 2023-09-10 RX ADMIN — IOPAMIDOL 75 ML: 755 INJECTION, SOLUTION INTRAVENOUS at 00:37

## 2023-09-10 RX ADMIN — WATER 1000 MG: 1 INJECTION INTRAMUSCULAR; INTRAVENOUS; SUBCUTANEOUS at 02:50

## 2023-09-10 RX ADMIN — INSULIN LISPRO 4 UNITS: 100 INJECTION, SOLUTION INTRAVENOUS; SUBCUTANEOUS at 20:42

## 2023-09-10 RX ADMIN — SODIUM CHLORIDE 1000 ML: 9 INJECTION, SOLUTION INTRAVENOUS at 03:01

## 2023-09-10 RX ADMIN — SODIUM CHLORIDE: 9 INJECTION, SOLUTION INTRAVENOUS at 06:46

## 2023-09-10 RX ADMIN — HYDRALAZINE HYDROCHLORIDE 25 MG: 25 TABLET, FILM COATED ORAL at 20:41

## 2023-09-10 RX ADMIN — AMLODIPINE BESYLATE 5 MG: 5 TABLET ORAL at 17:14

## 2023-09-10 RX ADMIN — METRONIDAZOLE 500 MG: 500 INJECTION, SOLUTION INTRAVENOUS at 20:45

## 2023-09-10 ASSESSMENT — PAIN SCALES - WONG BAKER: WONGBAKER_NUMERICALRESPONSE: 0

## 2023-09-10 NOTE — PROGRESS NOTES
4 Eyes Skin Assessment     NAME:  Dakotah Rondon  YOB: 1934  MEDICAL RECORD NUMBER:  [id number]    The patient is being assessed for  Admission    I agree that at least one RN has performed a thorough Head to Toe Skin Assessment on the patient. ALL assessment sites listed below have been assessed. Areas assessed by both nurses:    Head, Face, Ears, Shoulders, Back, Chest, Arms, Elbows, Hands, Sacrum. Buttock, Coccyx, Ischium, and Legs. Feet and Heels        Does the Patient have a Wound?  No noted wound(s)       Raimundo Prevention initiated by RN: Yes  Wound Care Orders initiated by RN: No    Pressure Injury (Stage 3,4, Unstageable, DTI, NWPT, and Complex wounds) if present, place Wound referral order by RN under : No    New Ostomies, if present place, Ostomy referral order under : No     Nurse 1 eSignature: Electronically signed by Yehuda Key RN on 9/10/23 at 6:48 PM EDT    **SHARE this note so that the co-signing nurse can place an eSignature**    Nurse 2 eSignature: {Esignature:[id number]}

## 2023-09-10 NOTE — ED NOTES
Attempted to obtain blood gas multiple times and unsuccessful.  Will notify RT and see if they are able to try     Robert Gomez RN  09/09/23 [id number]

## 2023-09-10 NOTE — PROGRESS NOTES
H&P by my partner today a.m. Patient seen at bedside today a.m., still confused, AO x0, not responding to command,    Labs and imaging reviewed    1. Marked distension of the urinary bladder, appearing to cause fullness of  the bilateral pelvocaliceal systems and ureters, and consider decompression. Please note that there may be minimal dependent density in the urinary  bladder which raise the possibility of debris, blood, or possibly even a  urothelial lesion. 2. Suspect rectal thickening, raising the possibility of proctitis or  neoplasm. Also, colonic thickening versus underdistention, predominately  involving the ascending colon, raising the possibility of nonspecific colitis  or even neoplasm. Finally, the stomach also appears relatively thick which  is favored to reflect underdistention but cannot exclude gastritis. 3. Trace free fluid in the pelvis.       Assessment and plan  CAT scan reports states marked distention of urinary bladder, straight urinary catheterization ordered  Continue ceftriaxone and metronidazole awaiting culture report  General surgery consultation

## 2023-09-10 NOTE — ED NOTES
Radiology Procedure Waiver   Name: Jackson Christina  : 1934  MRN: [id number]    Date:  23    Time: 11:58 PM EDT    Benefits of immediately proceeding with Radiology exam(s) without pre-testing outweigh the risks or are not indicated as specified below and therefore the following is/are being waived:    [] Pregnancy test   [] Patients LMP on-time and regular.   [] Patient had Tubal Ligation or has other Contraception Device. [] Patient  is Menopausal or Premenarcheal.    [] Patient had Full or Partial Hysterectomy. [] Protocol for Iodine allergy    [] MRI Questionnaire     [x] BUN/Creatinine   [] Patient age w/no hx of renal dysfunction. [] Patient on Dialysis. [] Recent Normal Labs.   Electronically signed by Jillian Russell DO on 23 at 11:58 PM EDT              Keaton Hedrick DO  Resident  23 [phone]

## 2023-09-10 NOTE — ED NOTES
Called report to [facility] for pt to transfer to [facility].      John Espinoza, DOUG  09/10/23 [id number]

## 2023-09-10 NOTE — H&P
suspected or confirmed emergency medical condition->Emergency Medical Condition (MA) What reading provider will be dictating this exam?->CRC FINDINGS: LOWER CHEST: Cardiomegaly. Marked calcification about the left ventricle. LIVER: Steatotic with suspected sparing involving segment 4. BILIARY: Gallbladder is without calcified stone. No significant biliary dilatation. SPLEEN: Unremarkable. PANCREAS: Predominantly fatty replacement and poorly evaluated. ADRENALS: Unremarkable. KIDNEYS: Symmetric enhancement. Fullness of the bilateral pelvocaliceal systems and ureters. Left renal cyst. GI: Gastric thickening versus under distension. No bowel obstruction. Colonic thickening versus under distension, predominantly involving the ascending colon. Also suspect rectal thickening and mild presacral stranding. Colonic diverticulosis. No pneumoperitoneum. LYMPH NODES: No significant lymphadenopathy. VESSELS: Abdominal aorta is nonaneurysmal.  Moderate to severe atherosclerotic calcification. There does appear to be luminal narrowing of the splenic artery and also at the origin of the celiac artery related to atherosclerosis. PELVIS: Bladder is markedly distended. Questionable hyperdense debris/material in the dependent portion of the urinary bladder. Uterus is not visualized. Trace free fluid. BONES: No aggressive osseous lesion. ADDITIONAL FINDINGS: Small fatty periumbilical hernia. 1. Marked distension of the urinary bladder, appearing to cause fullness of the bilateral pelvocaliceal systems and ureters, and consider decompression. Please note that there may be minimal dependent density in the urinary bladder which raise the possibility of debris, blood, or possibly even a urothelial lesion. 2. Suspect rectal thickening, raising the possibility of proctitis or neoplasm.   Also, colonic thickening versus underdistention, predominately involving the ascending colon, raising the possibility of nonspecific colitis or proximal sigmoid colon possibly representing mild uncomplicated acute diverticulitis. 2. Evidence of CHF exacerbation (cardiomegaly with trace pericardial and left pleural effusions and interstitial edema). CT Head W/O Contrast    Result Date: 8/28/2023  EXAMINATION: CT OF THE HEAD WITHOUT CONTRAST  8/28/2023 5:46 pm TECHNIQUE: CT of the head was performed without the administration of intravenous contrast. Automated exposure control, iterative reconstruction, and/or weight based adjustment of the mA/kV was utilized to reduce the radiation dose to as low as reasonably achievable. COMPARISON: None. HISTORY: ORDERING SYSTEM PROVIDED HISTORY: AMS TECHNOLOGIST PROVIDED HISTORY: Reason for exam:->AMS Has a \"code stroke\" or \"stroke alert\" been called? ->No Decision Support Exception - unselect if not a suspected or confirmed emergency medical condition->Emergency Medical Condition (MA) FINDINGS: BRAIN/VENTRICLES: There is no acute intracranial hemorrhage, mass effect or midline shift. No abnormal extra-axial fluid collection. The gray-white differentiation is maintained without evidence of an acute infarct. There is no evidence of hydrocephalus. The ventricles, cisterns and sulci are prominent consistent with atrophy. There is decreased attenuation within the periventricular white matter consistent with periventricular leukomalacia. There is an old lacunar infarct within the anterior aspect of the right thalamus. ORBITS: The visualized portion of the orbits demonstrate no acute abnormality. SINUSES: The visualized paranasal sinuses and mastoid air cells demonstrate no acute abnormality. SOFT TISSUES/SKULL:  No acute abnormality of the visualized skull or soft tissues. 1. There is no acute intracranial abnormality. Specifically, there is no intracranial hemorrhage. 2. Atrophy and periventricular leukomalacia, 3. Old lacunar infarct within the anterior aspect of the right thalamus.      XR CHEST

## 2023-09-10 NOTE — PLAN OF CARE
Problem: Chronic Conditions and Co-morbidities  Goal: Patient's chronic conditions and co-morbidity symptoms are monitored and maintained or improved  Outcome: Progressing  Flowsheets (Taken 9/10/2023 1130)  Care Plan - Patient's Chronic Conditions and Co-Morbidity Symptoms are Monitored and Maintained or Improved: Monitor and assess patient's chronic conditions and comorbid symptoms for stability, deterioration, or improvement

## 2023-09-10 NOTE — ED PROVIDER NOTES
[address] ENCOUNTER        Pt Name: Nelda Wilkerson  MRN: [id number]  [facility] [place] 6/29/1934  Date of evaluation: 9/9/2023  Provider: Stephanie Lopez DO  PCP: Leroy Mcgregor DO  Note Started: 8:46 PM EDT 9/9/23    CHIEF COMPLAINT       Chief Complaint   Patient presents with    Altered Mental Status     Pt from nursing home and is more altered than usual. Pt at baseline of responsiveness, but now has poor appetite and blood glucose running high. Pt given 1cc NSS and is more responsive than when picked up by EMS. PT sat 88% room air, arrived on 4L       HISTORY OF PRESENT ILLNESS: 1 or more Elements   History From: EMS    Limitations to history : Altered Mental Status    Nelda Wilkerson is a 80 y.o. female who presents to the emergency department from nursing home by EMS for altered mental status. Patient reportedly more altered than usual.  She reportedly has been having poor appetite and elevated blood glucose levels. She was given 500 cc by EMS. Satting 88% on room air was subsequently placed on 4 L nasal cannula. Patient not talking so additional history is difficult to obtain. Nursing Notes were all reviewed and agreed with or any disagreements were addressed in the HPI. REVIEW OF EXTERNAL NOTE :       Previous echocardiogram 8/28/2023 reviewed, EF 65%. REVIEW OF SYSTEMS :           Positives and Pertinent negatives as per HPI.      SURGICAL HISTORY     Past Surgical History:   Procedure Laterality Date    HYSTERECTOMY (CERVIX STATUS UNKNOWN)         CURRENTMEDICATIONS       Previous Medications    AMLODIPINE (NORVASC) 5 MG TABLET    Take 1 tablet by mouth daily    ASPIRIN 81 MG EC TABLET    Take 1 tablet by mouth daily    ATORVASTATIN (LIPITOR) 40 MG TABLET    Take 1 tablet by mouth nightly    CLOPIDOGREL (PLAVIX) 75 MG TABLET    Take 1 tablet by mouth daily    HYDRALAZINE (APRESOLINE) 25 MG TABLET    Take 1 tablet by

## 2023-09-11 ENCOUNTER — APPOINTMENT (OUTPATIENT)
Dept: GENERAL RADIOLOGY | Age: 88
End: 2023-09-11
Payer: MEDICARE

## 2023-09-11 LAB
ALBUMIN SERPL-MCNC: 2.8 G/DL (ref 3.5–5.2)
ALP SERPL-CCNC: 65 U/L (ref 35–104)
ALT SERPL-CCNC: 46 U/L (ref 0–32)
ANION GAP SERPL CALCULATED.3IONS-SCNC: 11 MMOL/L (ref 7–16)
ANION GAP SERPL CALCULATED.3IONS-SCNC: 11 MMOL/L (ref 7–16)
ANION GAP SERPL CALCULATED.3IONS-SCNC: 12 MMOL/L (ref 7–16)
ANION GAP SERPL CALCULATED.3IONS-SCNC: 16 MMOL/L (ref 7–16)
AST SERPL-CCNC: 41 U/L (ref 0–31)
BASOPHILS # BLD: 0 K/UL (ref 0–0.2)
BASOPHILS # BLD: 0.01 K/UL (ref 0–0.2)
BASOPHILS NFR BLD: 0 % (ref 0–2)
BASOPHILS NFR BLD: 0 % (ref 0–2)
BILIRUB SERPL-MCNC: 0.2 MG/DL (ref 0–1.2)
BUN SERPL-MCNC: 21 MG/DL (ref 6–23)
BUN SERPL-MCNC: 25 MG/DL (ref 6–23)
BUN SERPL-MCNC: 28 MG/DL (ref 6–23)
BUN SERPL-MCNC: 28 MG/DL (ref 6–23)
CALCIUM SERPL-MCNC: 6.5 MG/DL (ref 8.6–10.2)
CALCIUM SERPL-MCNC: 8.2 MG/DL (ref 8.6–10.2)
CALCIUM SERPL-MCNC: 8.2 MG/DL (ref 8.6–10.2)
CALCIUM SERPL-MCNC: 8.5 MG/DL (ref 8.6–10.2)
CHLORIDE SERPL-SCNC: 111 MMOL/L (ref 98–107)
CHLORIDE SERPL-SCNC: 114 MMOL/L (ref 98–107)
CHLORIDE SERPL-SCNC: 119 MMOL/L (ref 98–107)
CHLORIDE SERPL-SCNC: 122 MMOL/L (ref 98–107)
CO2 SERPL-SCNC: 15 MMOL/L (ref 22–29)
CO2 SERPL-SCNC: 16 MMOL/L (ref 22–29)
CO2 SERPL-SCNC: 17 MMOL/L (ref 22–29)
CO2 SERPL-SCNC: 17 MMOL/L (ref 22–29)
CREAT SERPL-MCNC: 1 MG/DL (ref 0.5–1)
CREAT SERPL-MCNC: 1.3 MG/DL (ref 0.5–1)
EOSINOPHIL # BLD: 0 K/UL (ref 0.05–0.5)
EOSINOPHIL # BLD: 0 K/UL (ref 0.05–0.5)
EOSINOPHILS RELATIVE PERCENT: 0 % (ref 0–6)
EOSINOPHILS RELATIVE PERCENT: 0 % (ref 0–6)
ERYTHROCYTE [DISTWIDTH] IN BLOOD BY AUTOMATED COUNT: 13.5 % (ref 11.5–15)
ERYTHROCYTE [DISTWIDTH] IN BLOOD BY AUTOMATED COUNT: 13.5 % (ref 11.5–15)
GFR SERPL CREATININE-BSD FRML MDRD: 38 ML/MIN/1.73M2
GFR SERPL CREATININE-BSD FRML MDRD: 41 ML/MIN/1.73M2
GFR SERPL CREATININE-BSD FRML MDRD: 41 ML/MIN/1.73M2
GFR SERPL CREATININE-BSD FRML MDRD: 52 ML/MIN/1.73M2
GLUCOSE BLD-MCNC: 159 MG/DL (ref 74–99)
GLUCOSE BLD-MCNC: 190 MG/DL (ref 74–99)
GLUCOSE BLD-MCNC: 222 MG/DL (ref 74–99)
GLUCOSE BLD-MCNC: 229 MG/DL (ref 74–99)
GLUCOSE BLD-MCNC: 294 MG/DL (ref 74–99)
GLUCOSE SERPL-MCNC: 177 MG/DL (ref 74–99)
GLUCOSE SERPL-MCNC: 181 MG/DL (ref 74–99)
GLUCOSE SERPL-MCNC: 250 MG/DL (ref 74–99)
GLUCOSE SERPL-MCNC: 383 MG/DL (ref 74–99)
HCT VFR BLD AUTO: 31.7 % (ref 34–48)
HCT VFR BLD AUTO: 36.3 % (ref 34–48)
HGB BLD-MCNC: 10.8 G/DL (ref 11.5–15.5)
HGB BLD-MCNC: 9.6 G/DL (ref 11.5–15.5)
IMM GRANULOCYTES # BLD AUTO: 0.06 K/UL (ref 0–0.58)
IMM GRANULOCYTES NFR BLD: 1 % (ref 0–5)
IRON SATN MFR SERPL: 9 % (ref 15–50)
IRON SERPL-MCNC: 15 UG/DL (ref 37–145)
LACTATE BLDV-SCNC: 1.6 MMOL/L (ref 0.5–2.2)
LYMPHOCYTES NFR BLD: 0.17 K/UL (ref 1.5–4)
LYMPHOCYTES NFR BLD: 0.36 K/UL (ref 1.5–4)
LYMPHOCYTES RELATIVE PERCENT: 2 % (ref 20–42)
LYMPHOCYTES RELATIVE PERCENT: 5 % (ref 20–42)
MAGNESIUM SERPL-MCNC: 1.8 MG/DL (ref 1.6–2.6)
MCH RBC QN AUTO: 31.2 PG (ref 26–35)
MCH RBC QN AUTO: 32 PG (ref 26–35)
MCHC RBC AUTO-ENTMCNC: 29.8 G/DL (ref 32–34.5)
MCHC RBC AUTO-ENTMCNC: 30.3 G/DL (ref 32–34.5)
MCV RBC AUTO: 104.9 FL (ref 80–99.9)
MCV RBC AUTO: 105.7 FL (ref 80–99.9)
MICROORGANISM SPEC CULT: ABNORMAL
MONOCYTES NFR BLD: 0.29 K/UL (ref 0.1–0.95)
MONOCYTES NFR BLD: 0.35 K/UL (ref 0.1–0.95)
MONOCYTES NFR BLD: 4 % (ref 2–12)
MONOCYTES NFR BLD: 4 % (ref 2–12)
NEUTROPHILS NFR BLD: 91 % (ref 43–80)
NEUTROPHILS NFR BLD: 95 % (ref 43–80)
NEUTS SEG NFR BLD: 7.28 K/UL (ref 1.8–7.3)
NEUTS SEG NFR BLD: 9.48 K/UL (ref 1.8–7.3)
OSMOLALITY UR: 714 MOSM/KG (ref 300–900)
PHOSPHATE SERPL-MCNC: 2.9 MG/DL (ref 2.5–4.5)
PLATELET # BLD AUTO: 136 K/UL (ref 130–450)
PLATELET # BLD AUTO: 159 K/UL (ref 130–450)
PMV BLD AUTO: 11.6 FL (ref 7–12)
PMV BLD AUTO: 11.7 FL (ref 7–12)
POTASSIUM SERPL-SCNC: 3 MMOL/L (ref 3.5–5)
POTASSIUM SERPL-SCNC: 3.8 MMOL/L (ref 3.5–5)
POTASSIUM SERPL-SCNC: 3.9 MMOL/L (ref 3.5–5)
POTASSIUM SERPL-SCNC: 3.9 MMOL/L (ref 3.5–5)
PROT SERPL-MCNC: 6.3 G/DL (ref 6.4–8.3)
RBC # BLD AUTO: 3 M/UL (ref 3.5–5.5)
RBC # BLD AUTO: 3.46 M/UL (ref 3.5–5.5)
RBC # BLD: ABNORMAL 10*6/UL
SODIUM SERPL-SCNC: 138 MMOL/L (ref 132–146)
SODIUM SERPL-SCNC: 142 MMOL/L (ref 132–146)
SODIUM SERPL-SCNC: 148 MMOL/L (ref 132–146)
SODIUM SERPL-SCNC: 153 MMOL/L (ref 132–146)
SODIUM UR-SCNC: 82 MMOL/L
SPECIMEN DESCRIPTION: ABNORMAL
TIBC SERPL-MCNC: 176 UG/DL (ref 250–450)
WBC OTHER # BLD: 10 K/UL (ref 4.5–11.5)
WBC OTHER # BLD: 8 K/UL (ref 4.5–11.5)

## 2023-09-11 PROCEDURE — 84300 ASSAY OF URINE SODIUM: CPT

## 2023-09-11 PROCEDURE — 82962 GLUCOSE BLOOD TEST: CPT

## 2023-09-11 PROCEDURE — 83935 ASSAY OF URINE OSMOLALITY: CPT

## 2023-09-11 PROCEDURE — 6360000002 HC RX W HCPCS: Performed by: FAMILY MEDICINE

## 2023-09-11 PROCEDURE — 71045 X-RAY EXAM CHEST 1 VIEW: CPT

## 2023-09-11 PROCEDURE — 36415 COLL VENOUS BLD VENIPUNCTURE: CPT

## 2023-09-11 PROCEDURE — 83735 ASSAY OF MAGNESIUM: CPT

## 2023-09-11 PROCEDURE — 6370000000 HC RX 637 (ALT 250 FOR IP): Performed by: STUDENT IN AN ORGANIZED HEALTH CARE EDUCATION/TRAINING PROGRAM

## 2023-09-11 PROCEDURE — 84100 ASSAY OF PHOSPHORUS: CPT

## 2023-09-11 PROCEDURE — 6360000002 HC RX W HCPCS: Performed by: STUDENT IN AN ORGANIZED HEALTH CARE EDUCATION/TRAINING PROGRAM

## 2023-09-11 PROCEDURE — 80053 COMPREHEN METABOLIC PANEL: CPT

## 2023-09-11 PROCEDURE — 85025 COMPLETE CBC W/AUTO DIFF WBC: CPT

## 2023-09-11 PROCEDURE — 83540 ASSAY OF IRON: CPT

## 2023-09-11 PROCEDURE — 2500000003 HC RX 250 WO HCPCS: Performed by: STUDENT IN AN ORGANIZED HEALTH CARE EDUCATION/TRAINING PROGRAM

## 2023-09-11 PROCEDURE — 2700000000 HC OXYGEN THERAPY PER DAY

## 2023-09-11 PROCEDURE — 80048 BASIC METABOLIC PNL TOTAL CA: CPT

## 2023-09-11 PROCEDURE — 2060000000 HC ICU INTERMEDIATE R&B

## 2023-09-11 PROCEDURE — 83605 ASSAY OF LACTIC ACID: CPT

## 2023-09-11 PROCEDURE — 74018 RADEX ABDOMEN 1 VIEW: CPT

## 2023-09-11 PROCEDURE — 2580000003 HC RX 258: Performed by: FAMILY MEDICINE

## 2023-09-11 PROCEDURE — 6370000000 HC RX 637 (ALT 250 FOR IP): Performed by: FAMILY MEDICINE

## 2023-09-11 PROCEDURE — 2580000003 HC RX 258: Performed by: STUDENT IN AN ORGANIZED HEALTH CARE EDUCATION/TRAINING PROGRAM

## 2023-09-11 PROCEDURE — 6360000002 HC RX W HCPCS

## 2023-09-11 PROCEDURE — 93005 ELECTROCARDIOGRAM TRACING: CPT | Performed by: INTERNAL MEDICINE

## 2023-09-11 PROCEDURE — 83550 IRON BINDING TEST: CPT

## 2023-09-11 PROCEDURE — 2580000003 HC RX 258: Performed by: INTERNAL MEDICINE

## 2023-09-11 RX ORDER — MAGNESIUM SULFATE 1 G/100ML
1000 INJECTION INTRAVENOUS ONCE
Status: COMPLETED | OUTPATIENT
Start: 2023-09-11 | End: 2023-09-12

## 2023-09-11 RX ORDER — DEXTROSE MONOHYDRATE 50 MG/ML
INJECTION, SOLUTION INTRAVENOUS CONTINUOUS
Status: DISCONTINUED | OUTPATIENT
Start: 2023-09-11 | End: 2023-09-11

## 2023-09-11 RX ORDER — INSULIN GLARGINE-YFGN 100 [IU]/ML
10 INJECTION, SOLUTION SUBCUTANEOUS DAILY
Status: DISCONTINUED | OUTPATIENT
Start: 2023-09-12 | End: 2023-09-12 | Stop reason: SDUPTHER

## 2023-09-11 RX ORDER — AMLODIPINE BESYLATE 10 MG/1
10 TABLET ORAL DAILY
Status: DISCONTINUED | OUTPATIENT
Start: 2023-09-11 | End: 2023-09-14 | Stop reason: HOSPADM

## 2023-09-11 RX ORDER — SODIUM CHLORIDE AND POTASSIUM CHLORIDE 150; 450 MG/100ML; MG/100ML
INJECTION, SOLUTION INTRAVENOUS CONTINUOUS
Status: DISPENSED | OUTPATIENT
Start: 2023-09-11 | End: 2023-09-12

## 2023-09-11 RX ORDER — METOPROLOL TARTRATE 5 MG/5ML
5 INJECTION INTRAVENOUS ONCE
Status: COMPLETED | OUTPATIENT
Start: 2023-09-11 | End: 2023-09-11

## 2023-09-11 RX ORDER — 0.9 % SODIUM CHLORIDE 0.9 %
1000 INTRAVENOUS SOLUTION INTRAVENOUS ONCE
Status: COMPLETED | OUTPATIENT
Start: 2023-09-11 | End: 2023-09-11

## 2023-09-11 RX ADMIN — SODIUM CHLORIDE: 9 INJECTION, SOLUTION INTRAVENOUS at 01:48

## 2023-09-11 RX ADMIN — WATER 1000 MG: 1 INJECTION INTRAMUSCULAR; INTRAVENOUS; SUBCUTANEOUS at 01:48

## 2023-09-11 RX ADMIN — MAGNESIUM SULFATE HEPTAHYDRATE 1000 MG: 1 INJECTION, SOLUTION INTRAVENOUS at 23:15

## 2023-09-11 RX ADMIN — METRONIDAZOLE 500 MG: 500 INJECTION, SOLUTION INTRAVENOUS at 01:51

## 2023-09-11 RX ADMIN — ASPIRIN 81 MG: 81 TABLET, COATED ORAL at 07:45

## 2023-09-11 RX ADMIN — HYDROMORPHONE HYDROCHLORIDE 0.25 MG: 1 INJECTION, SOLUTION INTRAMUSCULAR; INTRAVENOUS; SUBCUTANEOUS at 21:20

## 2023-09-11 RX ADMIN — INSULIN LISPRO 4 UNITS: 100 INJECTION, SOLUTION INTRAVENOUS; SUBCUTANEOUS at 17:22

## 2023-09-11 RX ADMIN — METRONIDAZOLE 500 MG: 500 INJECTION, SOLUTION INTRAVENOUS at 09:37

## 2023-09-11 RX ADMIN — ACETAMINOPHEN 650 MG: 650 SUPPOSITORY RECTAL at 20:19

## 2023-09-11 RX ADMIN — DEXTROSE MONOHYDRATE: 50 INJECTION, SOLUTION INTRAVENOUS at 11:40

## 2023-09-11 RX ADMIN — SODIUM CHLORIDE 1000 ML: 9 INJECTION, SOLUTION INTRAVENOUS at 20:33

## 2023-09-11 RX ADMIN — HYDRALAZINE HYDROCHLORIDE 25 MG: 25 TABLET, FILM COATED ORAL at 06:11

## 2023-09-11 RX ADMIN — METOROPROLOL TARTRATE 5 MG: 5 INJECTION, SOLUTION INTRAVENOUS at 23:13

## 2023-09-11 RX ADMIN — ENOXAPARIN SODIUM 30 MG: 100 INJECTION SUBCUTANEOUS at 07:45

## 2023-09-11 RX ADMIN — AMLODIPINE BESYLATE 10 MG: 10 TABLET ORAL at 09:38

## 2023-09-11 RX ADMIN — LEVOTHYROXINE SODIUM 25 MCG: 0.03 TABLET ORAL at 06:11

## 2023-09-11 RX ADMIN — AMLODIPINE BESYLATE 5 MG: 5 TABLET ORAL at 07:45

## 2023-09-11 RX ADMIN — HYDRALAZINE HYDROCHLORIDE 25 MG: 25 TABLET, FILM COATED ORAL at 13:22

## 2023-09-11 RX ADMIN — CLOPIDOGREL BISULFATE 75 MG: 75 TABLET ORAL at 07:45

## 2023-09-11 RX ADMIN — Medication 10 ML: at 23:16

## 2023-09-11 RX ADMIN — POTASSIUM CHLORIDE AND SODIUM CHLORIDE: 450; 150 INJECTION, SOLUTION INTRAVENOUS at 21:15

## 2023-09-11 RX ADMIN — METRONIDAZOLE 500 MG: 500 INJECTION, SOLUTION INTRAVENOUS at 17:24

## 2023-09-11 NOTE — SIGNIFICANT EVENT
Patients repeat BMP shows improvement in Sodium, K 3.0 Elevated Blood sugar and normal anion gap metabolic acidosis    Will dc 5% dextrose and start 1/2 NS with 20meq KCL at 75cc/hr    Repeat BMP tomorrow am.

## 2023-09-11 NOTE — CARE COORDINATION
Pt is from [facility] where she was snf;loc, not a bed hold but will take her back with a precert. All discharge paper work Is in place. Called son, Candelario Ortiz, who wants pt to return to the [facility]. Pt is on iv flagyl and rocephinfor UTI and colitis. NETO Bran  9/11/2023    Case Management Assessment  Initial Evaluation    Date/Time of Evaluation: 9/11/2023 10:33 AM  Assessment Completed by: NETO Bran    If patient is discharged prior to next notation, then this note serves as note for discharge by case management. Patient Name: Neto Barrios                   YOB: 1934  Diagnosis: Colitis [K52.9]  Pleural effusion, left [J90]  Altered mental status, unspecified altered mental status type [R41.82]  AMS (altered mental status) [R41.82]                   Date / Time: 9/9/2023  8:22 PM    Patient Admission Status: Inpatient   Readmission Risk (Low < 19, Mod (19-27), High > 27): Readmission Risk Score: 15.6    Current PCP: Meaghan Henry, DO  PCP verified by CM? Yes    Chart Reviewed: Yes      History Provided by: Child/Family, Other (see comment) (FROM [facility])  Patient Orientation: Person    Patient Cognition: Dementia / Early Alzheimer's    Hospitalization in the last 30 days (Readmission):  Yes    If yes, Readmission Assessment in CM Navigator will be completed. Advance Directives:      Code Status: Full Code   Patient's Primary Decision Maker is: Named in [facility]     Primary Decision MakerHonorHealth Rehabilitation Hospitalsherri Ghosh Child - [phone]    Discharge Planning:    Patient lives with:   Type of Home:    Primary Care Giver:  Other (Comment) (FROM [facility])  Patient Support Systems include: Children   Current Financial resources:    Current community resources:    Current services prior to admission:              Current DME:              Type of Home Care services:       ADLS  Prior functional level: Assistance with the following:, Mobility, Shopping, Housework, Bathing, Dressing,

## 2023-09-11 NOTE — PLAN OF CARE
Problem: Chronic Conditions and Co-morbidities  Goal: Patient's chronic conditions and co-morbidity symptoms are monitored and maintained or improved  9/11/2023 1136 by Stefanie Gutierrez RN  Outcome: Progressing  9/10/2023 2151 by Jason Dyer RN  Outcome: Progressing     Problem: Discharge Planning  Goal: Discharge to home or other facility with appropriate resources  9/11/2023 1136 by Stefanie Gutierrez RN  Outcome: Progressing  9/10/2023 2151 by Jason Dyer RN  Outcome: Progressing

## 2023-09-11 NOTE — ACP (ADVANCE CARE PLANNING)
Advance Care Planning   Healthcare Decision Maker:    Primary Decision Maker: Nicolas Heard - [phone]    Click here to complete Healthcare Decision Makers including selection of the Healthcare Decision Maker Relationship (ie \"Primary\").

## 2023-09-11 NOTE — PLAN OF CARE
Problem: Chronic Conditions and Co-morbidities  Goal: Patient's chronic conditions and co-morbidity symptoms are monitored and maintained or improved  9/10/2023 2151 by Billy Tomas RN  Outcome: Progressing  9/10/2023 1600 by Terra Maloney RN  Outcome: Progressing  Flowsheets (Taken 9/10/2023 1130)  Care Plan - Patient's Chronic Conditions and Co-Morbidity Symptoms are Monitored and Maintained or Improved: Monitor and assess patient's chronic conditions and comorbid symptoms for stability, deterioration, or improvement     Problem: Discharge Planning  Goal: Discharge to home or other facility with appropriate resources  9/10/2023 2151 by Billy Tomas RN  Outcome: Progressing  9/10/2023 1600 by Terra Maloney RN  Outcome: Progressing  Flowsheets (Taken 9/10/2023 1130)  Discharge to home or other facility with appropriate resources: Identify barriers to discharge with patient and caregiver     Problem: Safety - Adult  Goal: Free from fall injury  9/10/2023 2151 by Billy Tomas RN  Outcome: Progressing  9/10/2023 1600 by Terra Maloney RN  Outcome: Progressing     Problem: Skin/Tissue Integrity  Goal: Absence of new skin breakdown  Description: 1. Monitor for areas of redness and/or skin breakdown  2. Assess vascular access sites hourly  3. Every 4-6 hours minimum:  Change oxygen saturation probe site  4. Every 4-6 hours:  If on nasal continuous positive airway pressure, respiratory therapy assess nares and determine need for appliance change or resting period.   9/10/2023 2151 by Billy Tomas RN  Outcome: Progressing  9/10/2023 1600 by Terra Maloney RN  Outcome: Progressing     Problem: ABCDS Injury Assessment  Goal: Absence of physical injury  9/10/2023 2151 by Billy Tomas RN  Outcome: Progressing  9/10/2023 1600 by Terra Maloney RN  Outcome: Progressing

## 2023-09-12 PROBLEM — E78.49 OTHER HYPERLIPIDEMIA: Status: ACTIVE | Noted: 2023-09-12

## 2023-09-12 PROBLEM — K52.9 COLITIS: Status: ACTIVE | Noted: 2023-09-12

## 2023-09-12 PROBLEM — I10 PRIMARY HYPERTENSION: Status: ACTIVE | Noted: 2023-09-12

## 2023-09-12 PROBLEM — I48.0 PAF (PAROXYSMAL ATRIAL FIBRILLATION) (HCC): Status: ACTIVE | Noted: 2023-09-12

## 2023-09-12 PROBLEM — Z51.5 PALLIATIVE CARE ENCOUNTER: Status: ACTIVE | Noted: 2023-09-12

## 2023-09-12 PROBLEM — Z71.89 GOALS OF CARE, COUNSELING/DISCUSSION: Status: ACTIVE | Noted: 2023-09-12

## 2023-09-12 LAB
ANION GAP SERPL CALCULATED.3IONS-SCNC: 10 MMOL/L (ref 7–16)
ANION GAP SERPL CALCULATED.3IONS-SCNC: 10 MMOL/L (ref 7–16)
ANION GAP SERPL CALCULATED.3IONS-SCNC: 16 MMOL/L (ref 7–16)
B-OH-BUTYR SERPL-MCNC: 1.69 MMOL/L (ref 0.02–0.27)
B.E.: -5.7 MMOL/L (ref -3–3)
BACTERIA URNS QL MICRO: ABNORMAL
BASOPHILS # BLD: 0 K/UL (ref 0–0.2)
BASOPHILS NFR BLD: 0 % (ref 0–2)
BILIRUB UR QL STRIP: NEGATIVE
BUN SERPL-MCNC: 25 MG/DL (ref 6–23)
BUN SERPL-MCNC: 26 MG/DL (ref 6–23)
BUN SERPL-MCNC: 27 MG/DL (ref 6–23)
CALCIUM SERPL-MCNC: 7.8 MG/DL (ref 8.6–10.2)
CALCIUM SERPL-MCNC: 8 MG/DL (ref 8.6–10.2)
CALCIUM SERPL-MCNC: 8 MG/DL (ref 8.6–10.2)
CHLORIDE SERPL-SCNC: 115 MMOL/L (ref 98–107)
CHLORIDE SERPL-SCNC: 118 MMOL/L (ref 98–107)
CHLORIDE SERPL-SCNC: 123 MMOL/L (ref 98–107)
CLARITY UR: ABNORMAL
CO2 SERPL-SCNC: 13 MMOL/L (ref 22–29)
CO2 SERPL-SCNC: 17 MMOL/L (ref 22–29)
CO2 SERPL-SCNC: 17 MMOL/L (ref 22–29)
COHB: 1.1 % (ref 0–1.5)
COLOR UR: YELLOW
CREAT SERPL-MCNC: 1.2 MG/DL (ref 0.5–1)
CREAT SERPL-MCNC: 1.3 MG/DL (ref 0.5–1)
CREAT SERPL-MCNC: 1.3 MG/DL (ref 0.5–1)
CRITICAL: ABNORMAL
DATE ANALYZED: ABNORMAL
DATE OF COLLECTION: ABNORMAL
EKG ATRIAL RATE: 170 BPM
EKG ATRIAL RATE: 300 BPM
EKG ATRIAL RATE: 81 BPM
EKG P AXIS: 20 DEGREES
EKG P-R INTERVAL: 162 MS
EKG Q-T INTERVAL: 292 MS
EKG Q-T INTERVAL: 348 MS
EKG Q-T INTERVAL: 422 MS
EKG QRS DURATION: 82 MS
EKG QRS DURATION: 84 MS
EKG QRS DURATION: 84 MS
EKG QTC CALCULATION (BAZETT): 439 MS
EKG QTC CALCULATION (BAZETT): 467 MS
EKG QTC CALCULATION (BAZETT): 490 MS
EKG R AXIS: -29 DEGREES
EKG R AXIS: -29 DEGREES
EKG R AXIS: 32 DEGREES
EKG T AXIS: 120 DEGREES
EKG T AXIS: 171 DEGREES
EKG T AXIS: 77 DEGREES
EKG VENTRICULAR RATE: 154 BPM
EKG VENTRICULAR RATE: 81 BPM
EKG VENTRICULAR RATE: 96 BPM
EOSINOPHIL # BLD: 0 K/UL (ref 0.05–0.5)
EOSINOPHILS RELATIVE PERCENT: 0 % (ref 0–6)
ERYTHROCYTE [DISTWIDTH] IN BLOOD BY AUTOMATED COUNT: 13.6 % (ref 11.5–15)
GFR SERPL CREATININE-BSD FRML MDRD: 38 ML/MIN/1.73M2
GFR SERPL CREATININE-BSD FRML MDRD: 40 ML/MIN/1.73M2
GFR SERPL CREATININE-BSD FRML MDRD: 44 ML/MIN/1.73M2
GLUCOSE BLD-MCNC: 195 MG/DL (ref 74–99)
GLUCOSE BLD-MCNC: 219 MG/DL (ref 74–99)
GLUCOSE BLD-MCNC: 221 MG/DL (ref 74–99)
GLUCOSE BLD-MCNC: 246 MG/DL (ref 74–99)
GLUCOSE SERPL-MCNC: 240 MG/DL (ref 74–99)
GLUCOSE SERPL-MCNC: 249 MG/DL (ref 74–99)
GLUCOSE SERPL-MCNC: 258 MG/DL (ref 74–99)
GLUCOSE UR STRIP-MCNC: 250 MG/DL
HCO3: 17.3 MMOL/L (ref 22–26)
HCT VFR BLD AUTO: 31.8 % (ref 34–48)
HGB BLD-MCNC: 9.5 G/DL (ref 11.5–15.5)
HGB UR QL STRIP.AUTO: ABNORMAL
HHB: 4.1 % (ref 0–5)
KETONES UR STRIP-MCNC: NEGATIVE MG/DL
LAB: ABNORMAL
LEUKOCYTE ESTERASE UR QL STRIP: ABNORMAL
LYMPHOCYTES NFR BLD: 0.33 K/UL (ref 1.5–4)
LYMPHOCYTES RELATIVE PERCENT: 4 % (ref 20–42)
Lab: ABNORMAL
MCH RBC QN AUTO: 31.4 PG (ref 26–35)
MCHC RBC AUTO-ENTMCNC: 29.9 G/DL (ref 32–34.5)
MCV RBC AUTO: 105 FL (ref 80–99.9)
METHB: 0.4 % (ref 0–1.5)
MODE: ABNORMAL
MONOCYTES NFR BLD: 0.33 K/UL (ref 0.1–0.95)
MONOCYTES NFR BLD: 4 % (ref 2–12)
NEUTROPHILS NFR BLD: 92 % (ref 43–80)
NEUTS SEG NFR BLD: 7.54 K/UL (ref 1.8–7.3)
NITRITE UR QL STRIP: NEGATIVE
O2 CONTENT: 15.1 ML/DL
O2 SATURATION: 95.8 % (ref 92–98.5)
O2HB: 94.4 % (ref 94–97)
OPERATOR ID: ABNORMAL
PATIENT TEMP: 37 C
PCO2: 26.7 MMHG (ref 35–45)
PH BLOOD GAS: 7.43 (ref 7.35–7.45)
PH UR STRIP: 6 [PH] (ref 5–9)
PLATELET # BLD AUTO: 150 K/UL (ref 130–450)
PMV BLD AUTO: 11.9 FL (ref 7–12)
PO2: 80.3 MMHG (ref 75–100)
POTASSIUM SERPL-SCNC: 4 MMOL/L (ref 3.5–5)
PROCALCITONIN SERPL-MCNC: 0.63 NG/ML (ref 0–0.08)
PROT UR STRIP-MCNC: >=300 MG/DL
RBC # BLD AUTO: 3.03 M/UL (ref 3.5–5.5)
RBC # BLD: ABNORMAL 10*6/UL
RBC #/AREA URNS HPF: ABNORMAL /HPF
SODIUM SERPL-SCNC: 142 MMOL/L (ref 132–146)
SODIUM SERPL-SCNC: 145 MMOL/L (ref 132–146)
SODIUM SERPL-SCNC: 152 MMOL/L (ref 132–146)
SOURCE, BLOOD GAS: ABNORMAL
SP GR UR STRIP: >1.03 (ref 1–1.03)
THB: 11.3 G/DL (ref 11.5–16.5)
TIME ANALYZED: 1815
UROBILINOGEN UR STRIP-ACNC: 0.2 EU/DL (ref 0–1)
WBC #/AREA URNS HPF: ABNORMAL /HPF
WBC OTHER # BLD: 8.2 K/UL (ref 4.5–11.5)

## 2023-09-12 PROCEDURE — 2700000000 HC OXYGEN THERAPY PER DAY

## 2023-09-12 PROCEDURE — 93010 ELECTROCARDIOGRAM REPORT: CPT | Performed by: INTERNAL MEDICINE

## 2023-09-12 PROCEDURE — 81001 URINALYSIS AUTO W/SCOPE: CPT

## 2023-09-12 PROCEDURE — 6360000002 HC RX W HCPCS: Performed by: FAMILY MEDICINE

## 2023-09-12 PROCEDURE — 99222 1ST HOSP IP/OBS MODERATE 55: CPT | Performed by: INTERNAL MEDICINE

## 2023-09-12 PROCEDURE — 99222 1ST HOSP IP/OBS MODERATE 55: CPT | Performed by: NURSE PRACTITIONER

## 2023-09-12 PROCEDURE — 6370000000 HC RX 637 (ALT 250 FOR IP): Performed by: STUDENT IN AN ORGANIZED HEALTH CARE EDUCATION/TRAINING PROGRAM

## 2023-09-12 PROCEDURE — 80048 BASIC METABOLIC PNL TOTAL CA: CPT

## 2023-09-12 PROCEDURE — 36600 WITHDRAWAL OF ARTERIAL BLOOD: CPT

## 2023-09-12 PROCEDURE — 36415 COLL VENOUS BLD VENIPUNCTURE: CPT

## 2023-09-12 PROCEDURE — 82010 KETONE BODYS QUAN: CPT

## 2023-09-12 PROCEDURE — 84145 PROCALCITONIN (PCT): CPT

## 2023-09-12 PROCEDURE — S5553 INSULIN LONG ACTING 5 U: HCPCS | Performed by: STUDENT IN AN ORGANIZED HEALTH CARE EDUCATION/TRAINING PROGRAM

## 2023-09-12 PROCEDURE — 2580000003 HC RX 258: Performed by: FAMILY MEDICINE

## 2023-09-12 PROCEDURE — 6370000000 HC RX 637 (ALT 250 FOR IP): Performed by: FAMILY MEDICINE

## 2023-09-12 PROCEDURE — 82805 BLOOD GASES W/O2 SATURATION: CPT

## 2023-09-12 PROCEDURE — 85025 COMPLETE CBC W/AUTO DIFF WBC: CPT

## 2023-09-12 PROCEDURE — 82962 GLUCOSE BLOOD TEST: CPT

## 2023-09-12 PROCEDURE — 6360000002 HC RX W HCPCS: Performed by: STUDENT IN AN ORGANIZED HEALTH CARE EDUCATION/TRAINING PROGRAM

## 2023-09-12 PROCEDURE — APPSS60 APP SPLIT SHARED TIME 46-60 MINUTES: Performed by: CLINICAL NURSE SPECIALIST

## 2023-09-12 PROCEDURE — 2580000003 HC RX 258: Performed by: INTERNAL MEDICINE

## 2023-09-12 PROCEDURE — 2060000000 HC ICU INTERMEDIATE R&B

## 2023-09-12 PROCEDURE — 93005 ELECTROCARDIOGRAM TRACING: CPT | Performed by: FAMILY MEDICINE

## 2023-09-12 RX ORDER — ASPIRIN 81 MG/1
81 TABLET ORAL DAILY
Status: DISCONTINUED | OUTPATIENT
Start: 2023-09-12 | End: 2023-09-14 | Stop reason: HOSPADM

## 2023-09-12 RX ORDER — KETOROLAC TROMETHAMINE 30 MG/ML
15 INJECTION, SOLUTION INTRAMUSCULAR; INTRAVENOUS EVERY 6 HOURS PRN
Status: DISCONTINUED | OUTPATIENT
Start: 2023-09-12 | End: 2023-09-12

## 2023-09-12 RX ORDER — SODIUM CHLORIDE AND POTASSIUM CHLORIDE 150; 450 MG/100ML; MG/100ML
INJECTION, SOLUTION INTRAVENOUS CONTINUOUS
Status: DISPENSED | OUTPATIENT
Start: 2023-09-12 | End: 2023-09-13

## 2023-09-12 RX ORDER — INSULIN GLARGINE 100 [IU]/ML
10 INJECTION, SOLUTION SUBCUTANEOUS DAILY
Status: DISCONTINUED | OUTPATIENT
Start: 2023-09-13 | End: 2023-09-14 | Stop reason: HOSPADM

## 2023-09-12 RX ORDER — KETOROLAC TROMETHAMINE 30 MG/ML
30 INJECTION, SOLUTION INTRAMUSCULAR; INTRAVENOUS EVERY 6 HOURS PRN
Status: CANCELLED | OUTPATIENT
Start: 2023-09-12 | End: 2023-09-17

## 2023-09-12 RX ORDER — KETOROLAC TROMETHAMINE 30 MG/ML
15 INJECTION, SOLUTION INTRAMUSCULAR; INTRAVENOUS ONCE
Status: COMPLETED | OUTPATIENT
Start: 2023-09-12 | End: 2023-09-12

## 2023-09-12 RX ORDER — LABETALOL HYDROCHLORIDE 5 MG/ML
10 INJECTION, SOLUTION INTRAVENOUS ONCE
Status: COMPLETED | OUTPATIENT
Start: 2023-09-12 | End: 2023-09-12

## 2023-09-12 RX ORDER — SODIUM CHLORIDE 9 MG/ML
INJECTION, SOLUTION INTRAVENOUS ONCE
Status: COMPLETED | OUTPATIENT
Start: 2023-09-12 | End: 2023-09-12

## 2023-09-12 RX ORDER — ASPIRIN 300 MG/1
300 SUPPOSITORY RECTAL DAILY
Status: DISCONTINUED | OUTPATIENT
Start: 2023-09-12 | End: 2023-09-14 | Stop reason: HOSPADM

## 2023-09-12 RX ADMIN — METRONIDAZOLE 500 MG: 500 INJECTION, SOLUTION INTRAVENOUS at 17:51

## 2023-09-12 RX ADMIN — INSULIN GLARGINE-YFGN 10 UNITS: 100 INJECTION, SOLUTION SUBCUTANEOUS at 09:20

## 2023-09-12 RX ADMIN — ENOXAPARIN SODIUM 30 MG: 100 INJECTION SUBCUTANEOUS at 09:20

## 2023-09-12 RX ADMIN — METRONIDAZOLE 500 MG: 500 INJECTION, SOLUTION INTRAVENOUS at 01:48

## 2023-09-12 RX ADMIN — Medication 10 ML: at 09:20

## 2023-09-12 RX ADMIN — INSULIN LISPRO 2 UNITS: 100 INJECTION, SOLUTION INTRAVENOUS; SUBCUTANEOUS at 17:49

## 2023-09-12 RX ADMIN — METRONIDAZOLE 500 MG: 500 INJECTION, SOLUTION INTRAVENOUS at 10:28

## 2023-09-12 RX ADMIN — ASPIRIN 300 MG: 300 SUPPOSITORY RECTAL at 14:05

## 2023-09-12 RX ADMIN — POTASSIUM CHLORIDE AND SODIUM CHLORIDE: 450; 150 INJECTION, SOLUTION INTRAVENOUS at 15:25

## 2023-09-12 RX ADMIN — WATER 1000 MG: 1 INJECTION INTRAMUSCULAR; INTRAVENOUS; SUBCUTANEOUS at 00:22

## 2023-09-12 RX ADMIN — LABETALOL HYDROCHLORIDE 10 MG: 5 INJECTION INTRAVENOUS at 02:12

## 2023-09-12 RX ADMIN — ACETAMINOPHEN 650 MG: 650 SUPPOSITORY RECTAL at 02:26

## 2023-09-12 RX ADMIN — SODIUM CHLORIDE: 9 INJECTION, SOLUTION INTRAVENOUS at 20:42

## 2023-09-12 RX ADMIN — KETOROLAC TROMETHAMINE 15 MG: 30 INJECTION, SOLUTION INTRAMUSCULAR; INTRAVENOUS at 02:12

## 2023-09-12 ASSESSMENT — PAIN SCALES - GENERAL
PAINLEVEL_OUTOF10: 0

## 2023-09-12 ASSESSMENT — PAIN SCALES - WONG BAKER: WONGBAKER_NUMERICALRESPONSE: 0

## 2023-09-12 NOTE — CONSULTS
vial 10 Units, 10 Units, SubCUTAneous, Daily  0.45 % NaCl with KCl 20 mEq infusion, , IntraVENous, Continuous  cefTRIAXone (ROCEPHIN) 1,000 mg in sterile water 10 mL IV syringe, 1,000 mg, IntraVENous, Q24H  metronidazole (FLAGYL) 500 mg in 0.9% NaCl 100 mL IVPB premix, 500 mg, IntraVENous, Q8H  sodium chloride flush 0.9 % injection 10 mL, 10 mL, IntraVENous, 2 times per day  sodium chloride flush 0.9 % injection 10 mL, 10 mL, IntraVENous, PRN  0.9 % sodium chloride infusion, , IntraVENous, PRN  enoxaparin Sodium (LOVENOX) injection 30 mg, 30 mg, SubCUTAneous, Daily  promethazine (PHENERGAN) tablet 12.5 mg, 12.5 mg, Oral, Q6H PRN **OR** ondansetron (ZOFRAN) injection 4 mg, 4 mg, IntraVENous, Q6H PRN  polyethylene glycol (GLYCOLAX) packet 17 g, 17 g, Oral, Daily PRN  acetaminophen (TYLENOL) tablet 650 mg, 650 mg, Oral, Q6H PRN **OR** acetaminophen (TYLENOL) suppository 650 mg, 650 mg, Rectal, Q6H PRN  levothyroxine (SYNTHROID) tablet 25 mcg, 25 mcg, Oral, Daily  aspirin EC tablet 81 mg, 81 mg, Oral, Daily  atorvastatin (LIPITOR) tablet 40 mg, 40 mg, Oral, Nightly  clopidogrel (PLAVIX) tablet 75 mg, 75 mg, Oral, Daily  hydrALAZINE (APRESOLINE) tablet 25 mg, 25 mg, Oral, 3 times per day  glucose chewable tablet 16 g, 4 tablet, Oral, PRN  dextrose bolus 10% 125 mL, 125 mL, IntraVENous, PRN **OR** dextrose bolus 10% 250 mL, 250 mL, IntraVENous, PRN  glucagon injection 1 mg, 1 mg, SubCUTAneous, PRN  dextrose 10 % infusion, , IntraVENous, Continuous PRN  insulin lispro (HUMALOG) injection vial 0-8 Units, 0-8 Units, SubCUTAneous, TID WC  insulin lispro (HUMALOG) injection vial 0-4 Units, 0-4 Units, SubCUTAneous, Nightly    Allergies:  Patient has no known allergies. Social History:  unobtainable  Social History     Socioeconomic History    Marital status:       Spouse name: Not on file    Number of children: Not on file    Years of education: Not on file    Highest education level: Not on file   Occupational History Summary (Last 24 hours) at 9/12/2023 0818  Last data filed at 9/12/2023 0602  Gross per 24 hour   Intake 1743.96 ml   Output 850 ml   Net 893.96 ml       Labs:   CBC:   Recent Labs     09/11/23 2048 09/12/23  0532   WBC 8.0 8.2   HGB 9.6* 9.5*   HCT 31.7* 31.8*    150     BMP:   Recent Labs     09/11/23 1736 09/11/23 2048    142   K 3.0* 3.9   CO2 16* 17*   BUN 21 25*   CREATININE 1.0 1.3*   LABGLOM 52* 41*   CALCIUM 6.5* 8.2*     Mag:   Recent Labs     09/11/23 2054   MG 1.8     Phos:   Recent Labs     09/11/23 2054   PHOS 2.9     HgA1c:   Lab Results   Component Value Date    LABA1C 6.4 (H) 08/29/2023       CARDIAC ENZYMES:  Recent Labs     09/09/23  2220 09/10/23  0048   TROPHS 56* 55*      FASTING LIPID PANEL:  Lab Results   Component Value Date/Time    CHOL 169 08/29/2023 08:25 AM    HDL 57 08/29/2023 08:25 AM    TRIG 72 08/29/2023 08:25 AM     LIVER PROFILE:  Recent Labs     09/09/23 2220 09/11/23  0646   AST 42* 41*   ALT 45* 46*   LABALBU 3.5 2.8*     CTA abdomen and pelvis 9/10/2023:   Impression:    1. Marked distension of the urinary bladder, appearing to cause fullness of the bilateral pelvocaliceal systems and ureters, and consider decompression. Please note that there may be minimal dependent density in the urinary bladder which raise the possibility of debris, blood, or possibly even a urothelial lesion. 2. Suspect rectal thickening, raising the possibility of proctitis or neoplasm. Also, colonic thickening versus underdistention, predominately involving the ascending colon, raising the possibility of nonspecific colitis   or even neoplasm. Finally, the stomach also appears relatively thick which is favored to reflect underdistention but cannot exclude gastritis. 3. Trace free fluid in the pelvis. CT head without contrast 9/10/2023:   Impression:     No acute intracranial abnormality. Periventricular white matter changes consistent with chronic microvascular disease.

## 2023-09-12 NOTE — CONSULTS
9/12/2023 12:40 PM  Shira Whyte  [id number]     Chief Complaint:    Urinary retention      History of Present Illness: The patient is a 80 y.o. female patient who presented to the hospital with complaints of altered mental status from a nursing facility, poor appetites, and hyperglycemia. She was admitted for possible colitis. Urology is asked to evaluate for urinary retention. CT imaging showed a marked distension of the bladder. A yates was inserted for decompression for 1600ml of output. She was seen by our practice on 9/1 for urinary retention with recommendations to leave the yates indwelling until she was more ambulatory. This must have been removed at the nursing facility. She has been febrile. The yates is draining suzy urine. No family is present. She is a poor historian. Past Medical History:   Diagnosis Date    Diabetes ([address])     Hypertension          Past Surgical History:   Procedure Laterality Date    HYSTERECTOMY (CERVIX STATUS UNKNOWN)         Medications Prior to Admission:    Medications Prior to Admission: levothyroxine (SYNTHROID) 25 MCG tablet, Take 1 tablet by mouth Daily  atorvastatin (LIPITOR) 40 MG tablet, Take 1 tablet by mouth nightly  hydrALAZINE (APRESOLINE) 25 MG tablet, Take 1 tablet by mouth every 8 hours  pregabalin (LYRICA) 25 MG capsule, Take 1 capsule by mouth 3 times daily for 30 days. Max Daily Amount: 75 mg  clopidogrel (PLAVIX) 75 MG tablet, Take 1 tablet by mouth daily  insulin lispro (HUMALOG) 100 UNIT/ML injection vial, Inject 0-8 Units into the skin 3 times daily (before meals) *Per Sliding Scale*  aspirin 81 MG EC tablet, Take 1 tablet by mouth daily  amLODIPine (NORVASC) 5 MG tablet, Take 1 tablet by mouth daily  insulin glargine (LANTUS SOLOSTAR) 100 UNIT/ML injection pen, Inject 30 Units into the skin daily    Allergies:    Patient has no known allergies. Social History:    reports that she has never smoked.  She has never used smokeless tobacco. She

## 2023-09-12 NOTE — PROGRESS NOTES
Message sent to Dr. Maverick Mcfarland regarding patient's change in heart rate after IV Labetalol. EKG ordered.

## 2023-09-12 NOTE — CONSULTS
NEUROLOGY CONSULT NOTE      Requesting Physician:  Parveen Díaz MD    Reason for Consult:  Evaluate for recommendation to switch aspirin to eliquis for A fib. History of Present Illness: Chris Bello is a 80 y.o. female  with h/o HTN, DM II, Hypothyroidism, and HLD who was admitted to [facility] on 9/9/2023 with presentation of mental status. Patient presented from nursing home where she had been residing. She was recently hospitalized for acute alteration mental status with unresponsiveness and nonverbal status at home. 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 Posthospitalization, she was discharged to nursing home on aspirin, Plavix, and Lipitor. A Zio patch was placed for cardiac monitoring. Now admitted for altered mental status with report of poor appetite and high serum glucose levels. He was given 500 cc IVF by EMS with increased responsiveness. O2 saturation on admission was decreased at 88% with 4 L/min O2 via NC started. It was a poor historian could not provide any significant history. Review of chart indicates she has been at baseline level of functioning nursing home prior to recent change. Labs evaluation showed increased BUN/creatinine 47/1.4 with glucose of 252. Urinalysis shows trace leuk esterase with 3+ bacteria. CT scan of the abdomen showed distended bladder with possible colitis. Fuentes was placed 1600 mL urine drained today. Patient was started on antibiotic therapy with Rocephin and Flagyl for possible UTI with labs ordered for possible C. difficile. Patient reportedly had been experiencing multiple episodes of diarrhea. There was no serum leukocytosis present. No report of any focal neurologic deficits with CT scan of the head 9/10/2023 showing no acute intracranial abnormalities. MRI of the brain done 8/30/2023 showed acute or early subacute infarctions in the medial aspect of the left frontal lobe.   There were also small chronic infarcts in the right counseling/discussion  Resolved Problems:    * No resolved hospital problems. *      Recommendations:                                            Agree with planned anticoagulation with Eliquis. Continue on current medical therapy  No indication for further neurologic evaluation. Please re-consult if further need. It was my pleasure to evaluate Boy Hussein today. Please call with questions.       Electronically signed by Shannon Loomis MD on 9/12/2023 at 4:04 PM

## 2023-09-12 NOTE — PROGRESS NOTES
Message sent to Dr. Caden Espinoza regarding patient's vital signs.  15mg IV Toradol and 10mg IV Labetalol ordered

## 2023-09-12 NOTE — PROGRESS NOTES
Occupational Therapy    Date:2023  Patient Name: Chris Bello  MRN: [id number]  : 1934  Room: [address] [place] [address]-A     OT orders received and chart reviewed. OT eval on hold at this time per RN. OT will follow and re-attempt eval as appropriate, at a later time/date.     Fadumo Waddell OTR/L #[id number]

## 2023-09-12 NOTE — CONSULTS
Department of Internal Medicine  Nephrology Attending Consult Note      Reason for Consult: Metabolic acidosis, hypernatremia  Requesting Physician:  Joshua River MD    CHIEF COMPLAINT: Altered mental status    History Obtained From:  reason patient could not give history:  altered mental status    HISTORY OF PRESENT ILLNESS: Briefly Mrs. John Pacheco is a 27-year-old female with history of HTN, type II DM, hypothyroidism, hyperlipidemia, recently discharged from the hospital after admission with altered mental status and found to have acute CVA in the left frontal lobe, at that time echocardiogram showed normal EF of 65% with stage II DD, and who was readmitted on September 9, 2023 after she was referred from [facility] to the ER for evaluation of altered mental status. CT scan of the abdomen demonstrated marked distention of the urinary bladder as well as colonic thickening probably colitis. After Fuentes catheter was inserted she had about 1600 mL of immediate output. On admission her sodium level was 144 mEq/L creatinine was 1.4 mg/dL, but since admission her sodium level had increased up to 152 mEq/L, as well her bicarbonate level has dropped from 20 mEq/L down to 13 mg/L today, reason for this consultation. She had an ABG on September 10 which showed a normal pH of 3.172 with metabolic acidosis (bicarbonate level 19) and respiratory alkalosis. She had received IV fluids mainly normal saline and half-normal saline. Overnight patient had RRT due to tachycardia and fever, her EKG showed atrial flutter.     Past Medical History:        Diagnosis Date    Diabetes ([address])     Hypertension      Past Surgical History:        Procedure Laterality Date    HYSTERECTOMY (CERVIX STATUS UNKNOWN)       Current Medications:    Current Facility-Administered Medications: [START ON 9/13/2023] insulin glargine (LANTUS) injection vial 10 Units, 10 Units, SubCUTAneous, Daily  aspirin EC tablet 81 mg, 81 mg, Oral, Daily **OR** aspirin osmotic diuresis due to hyperglycemia  HTN, on amlodipine, hydralazine  Urinary retention, status post Fuentes catheter placement  --------------------------------------------------------------  Hyperlipidemia, on atorvastatin  Hypothyroidism, on levothyroxine  UTI, on ceftriaxone  History of CVA  Type II DM  Macrocytic anemia, with iron deficiency iron level 15, iron saturation 9%, folate >20, B12 839    Plan:    Obtain ABGs  Continue IV fluids 0.45% sodium chloride with 20 KCl, increase to 100 cc/hour  Better glucose control  Obtain beta hydroxybutyrate level  Monitor renal function  Monitor bicarbonate level  Monitor sodium level    Thank you very much Dr. Lennie Weaver, for allowing us to participate in the care of Mrs. Conchis Lambert

## 2023-09-12 NOTE — PROGRESS NOTES
Physician Progress Note      PATIENT:               Larry Phan  CSN #:                  [id number]  :                       1934  ADMIT DATE:       2023 3:08 PM  [facility] DATE:        2023 4:57 AM  RESPONDING  PROVIDER #:        Magy FLANNERY          QUERY TEXT:    Pt admitted with AMS and has CHF documented. If possible, please document in   progress notes and discharge summary further specificity regarding the type   and acuity of CHF:    The medical record reflects the following:  Risk Factors: Trace pericardial effusion, Trace L pleural effusion, CKD  Clinical Indicators: Per IM notes: CT of the AP with evidence of mild,   uncomplicated acute diverticulitis and CHF exacerbation with a trace   pericardial and left pleural effusions with interstitial edema. Abdul Corrente Abdul Corrente Suspected   acute CHF.  ECHO: Normal left ventricular systolic function, EF 90%. Moderate left ventricular concentric hypertrophy noted. Stage II diastolic   dysfunction. Pro-BNP: 2568. Treatment: IV Lasix, I&Os, Daily weights,    Thank you,  DOUG [email or web address]  Options provided:  -- Acute on Chronic Diastolic CHF/HFpEF  -- Acute Diastolic CHF/HFpEF  -- Other - I will add my own diagnosis  -- Disagree - Not applicable / Not valid  -- Disagree - Clinically unable to determine / Unknown  -- Refer to Clinical Documentation Reviewer    PROVIDER RESPONSE TEXT:    This patient is in acute on chronic diastolic CHF/HFpEF.     Query created by: Luis Harrington on 2023 11:27 AM      Electronically signed by:  Lindsay Mirza 2023 6:51 AM

## 2023-09-12 NOTE — CARE COORDINATION
SOCIAL WORK/CASEMANAGEMENT [facility]  RRT last night for sepsis/uti. Cardio and urology consulted. Pt is  on iv fluids and rocephin . Fuentes in for retention. PT and OT to eval. Pt is from [facility] and is to return per family, precert is needed, all discharge paper work is in place. On 2l nc and pallative care consult placed.  Ale Love, LSW 9/12/2023

## 2023-09-12 NOTE — PROGRESS NOTES
Physical Therapy    PT order received and medical chart reviewed 9/12. Holding PT evaluation due to current medical status. Will re-attempt as able. Thank you.     Sara Maza, PT, DPT  [id number]

## 2023-09-12 NOTE — CONSULTS
Palliative Care Department  [phone]  Palliative Care Initial Consult  Provider Olesya Winter, APRN - CNP    Helga Harper  [id number]  Hospital Day: 4  Date of Initial Consult: 9/12/2023  Referring Provider: Laron Holley MD  Palliative Medicine was consulted for assistance with: Goals of care, code status discussion and family support    HPI:   Helga Harper is a 80 y.o. with a medical history of DM, CKDand HTN who was admitted on 9/9/2023 from SNF with a CHIEF COMPLAINT of AMS. Nursing facility reports patient having a poor appetite. Laboratory studies demonstrate BUN 47, creatinine 1.4, glucose 252, troponin 56 with repeat of 55 and hemoglobin 11.3. Urinalysis shows trace leukocyte Estrace, 2+ bacteria. CT abdomen pelvis shows features suspicious for colitis. Patient was admitted for further medical management. 9/11 RRT called for HR >130. Palliative medicine was consulted for goals of care, code status discussion and family support. ASSESSMENT/PLAN:     Pertinent Hospital Diagnoses     Sepsis  CKD    Palliative Care Encounter / Counseling Regarding Goals of Care  Please see detailed goals of care discussion as below  At this time, Helga Harper, Does Not have capacity for medical decision-making.   Capacity is time limited and situation/question specific  During encounter Paty Duke was surrogate medical decision-maker  Outcome of goals of care meeting:   Change code status to [facility]  Code status DNR-CCA  Advanced Directives: no POA or living will in [facility]  Surrogate/Legal NOK:  Selwyn Jacobson (child) [phone]  Isa Graham (grandchild) [phone]    Spiritual assessment: no spiritual distress identified  Bereavement and grief: to be determined  Referrals to: none today  SUBJECTIVE:     Current medical issues leading to Palliative Medicine involvement include   Active Hospital Problems    Diagnosis Date Noted    AMS (altered mental status) [R41.82] 08/28/2023       Details of Conversation:  Chart

## 2023-09-13 LAB
ALBUMIN SERPL-MCNC: 2.4 G/DL (ref 3.5–5.2)
ALP SERPL-CCNC: 53 U/L (ref 35–104)
ALT SERPL-CCNC: 47 U/L (ref 0–32)
ANION GAP SERPL CALCULATED.3IONS-SCNC: 11 MMOL/L (ref 7–16)
ANION GAP SERPL CALCULATED.3IONS-SCNC: 11 MMOL/L (ref 7–16)
AST SERPL-CCNC: 60 U/L (ref 0–31)
BASOPHILS # BLD: 0 K/UL (ref 0–0.2)
BASOPHILS NFR BLD: 0 % (ref 0–2)
BILIRUB SERPL-MCNC: 0.2 MG/DL (ref 0–1.2)
BUN SERPL-MCNC: 28 MG/DL (ref 6–23)
BUN SERPL-MCNC: 36 MG/DL (ref 6–23)
CA-I BLD-SCNC: 1.15 MMOL/L (ref 1.15–1.33)
CALCIUM SERPL-MCNC: 7.9 MG/DL (ref 8.6–10.2)
CALCIUM SERPL-MCNC: 7.9 MG/DL (ref 8.6–10.2)
CHLORIDE SERPL-SCNC: 115 MMOL/L (ref 98–107)
CHLORIDE SERPL-SCNC: 119 MMOL/L (ref 98–107)
CO2 SERPL-SCNC: 14 MMOL/L (ref 22–29)
CO2 SERPL-SCNC: 15 MMOL/L (ref 22–29)
CREAT SERPL-MCNC: 1.3 MG/DL (ref 0.5–1)
CREAT SERPL-MCNC: 1.6 MG/DL (ref 0.5–1)
EOSINOPHIL # BLD: 0 K/UL (ref 0.05–0.5)
EOSINOPHILS RELATIVE PERCENT: 0 % (ref 0–6)
ERYTHROCYTE [DISTWIDTH] IN BLOOD BY AUTOMATED COUNT: 13.7 % (ref 11.5–15)
GFR SERPL CREATININE-BSD FRML MDRD: 30 ML/MIN/1.73M2
GFR SERPL CREATININE-BSD FRML MDRD: 38 ML/MIN/1.73M2
GLUCOSE BLD-MCNC: 194 MG/DL (ref 74–99)
GLUCOSE BLD-MCNC: 203 MG/DL (ref 74–99)
GLUCOSE BLD-MCNC: 285 MG/DL (ref 74–99)
GLUCOSE BLD-MCNC: 345 MG/DL (ref 74–99)
GLUCOSE SERPL-MCNC: 197 MG/DL (ref 74–99)
GLUCOSE SERPL-MCNC: 287 MG/DL (ref 74–99)
HCT VFR BLD AUTO: 34.3 % (ref 34–48)
HGB BLD-MCNC: 10.3 G/DL (ref 11.5–15.5)
LYMPHOCYTES NFR BLD: 0.61 K/UL (ref 1.5–4)
LYMPHOCYTES RELATIVE PERCENT: 6 % (ref 20–42)
MCH RBC QN AUTO: 31.4 PG (ref 26–35)
MCHC RBC AUTO-ENTMCNC: 30 G/DL (ref 32–34.5)
MCV RBC AUTO: 104.6 FL (ref 80–99.9)
MONOCYTES NFR BLD: 0.2 K/UL (ref 0.1–0.95)
MONOCYTES NFR BLD: 2 % (ref 2–12)
NEUTROPHILS NFR BLD: 92 % (ref 43–80)
NEUTS SEG NFR BLD: 9.38 K/UL (ref 1.8–7.3)
PLATELET # BLD AUTO: 146 K/UL (ref 130–450)
PMV BLD AUTO: 11.8 FL (ref 7–12)
POTASSIUM SERPL-SCNC: 4.5 MMOL/L (ref 3.5–5)
POTASSIUM SERPL-SCNC: 4.6 MMOL/L (ref 3.5–5)
PROT SERPL-MCNC: 5.5 G/DL (ref 6.4–8.3)
RBC # BLD AUTO: 3.28 M/UL (ref 3.5–5.5)
RBC # BLD: ABNORMAL 10*6/UL
SODIUM SERPL-SCNC: 141 MMOL/L (ref 132–146)
SODIUM SERPL-SCNC: 144 MMOL/L (ref 132–146)
WBC # BLD: ABNORMAL 10*3/UL
WBC OTHER # BLD: 10.2 K/UL (ref 4.5–11.5)

## 2023-09-13 PROCEDURE — 2700000000 HC OXYGEN THERAPY PER DAY

## 2023-09-13 PROCEDURE — 99233 SBSQ HOSP IP/OBS HIGH 50: CPT | Performed by: NURSE PRACTITIONER

## 2023-09-13 PROCEDURE — 6360000002 HC RX W HCPCS: Performed by: INTERNAL MEDICINE

## 2023-09-13 PROCEDURE — 2500000003 HC RX 250 WO HCPCS: Performed by: INTERNAL MEDICINE

## 2023-09-13 PROCEDURE — 2580000003 HC RX 258: Performed by: INTERNAL MEDICINE

## 2023-09-13 PROCEDURE — 80048 BASIC METABOLIC PNL TOTAL CA: CPT

## 2023-09-13 PROCEDURE — 36415 COLL VENOUS BLD VENIPUNCTURE: CPT

## 2023-09-13 PROCEDURE — 6360000002 HC RX W HCPCS: Performed by: FAMILY MEDICINE

## 2023-09-13 PROCEDURE — 99233 SBSQ HOSP IP/OBS HIGH 50: CPT | Performed by: INTERNAL MEDICINE

## 2023-09-13 PROCEDURE — 82962 GLUCOSE BLOOD TEST: CPT

## 2023-09-13 PROCEDURE — 6370000000 HC RX 637 (ALT 250 FOR IP): Performed by: STUDENT IN AN ORGANIZED HEALTH CARE EDUCATION/TRAINING PROGRAM

## 2023-09-13 PROCEDURE — 2580000003 HC RX 258: Performed by: FAMILY MEDICINE

## 2023-09-13 PROCEDURE — 2060000000 HC ICU INTERMEDIATE R&B

## 2023-09-13 PROCEDURE — 85025 COMPLETE CBC W/AUTO DIFF WBC: CPT

## 2023-09-13 PROCEDURE — 82330 ASSAY OF CALCIUM: CPT

## 2023-09-13 PROCEDURE — 80053 COMPREHEN METABOLIC PANEL: CPT

## 2023-09-13 RX ORDER — HYDRALAZINE HYDROCHLORIDE 25 MG/1
25 TABLET, FILM COATED ORAL EVERY 6 HOURS PRN
Status: DISCONTINUED | OUTPATIENT
Start: 2023-09-13 | End: 2023-09-14 | Stop reason: HOSPADM

## 2023-09-13 RX ORDER — MORPHINE SULFATE 4 MG/ML
4 INJECTION, SOLUTION INTRAMUSCULAR; INTRAVENOUS EVERY 4 HOURS PRN
Status: DISCONTINUED | OUTPATIENT
Start: 2023-09-13 | End: 2023-09-14 | Stop reason: HOSPADM

## 2023-09-13 RX ORDER — METOPROLOL TARTRATE 5 MG/5ML
5 INJECTION INTRAVENOUS EVERY 6 HOURS
Status: DISCONTINUED | OUTPATIENT
Start: 2023-09-13 | End: 2023-09-14 | Stop reason: HOSPADM

## 2023-09-13 RX ADMIN — INSULIN LISPRO 4 UNITS: 100 INJECTION, SOLUTION INTRAVENOUS; SUBCUTANEOUS at 18:19

## 2023-09-13 RX ADMIN — Medication 10 ML: at 09:21

## 2023-09-13 RX ADMIN — MORPHINE SULFATE 4 MG: 4 INJECTION, SOLUTION INTRAMUSCULAR; INTRAVENOUS at 16:32

## 2023-09-13 RX ADMIN — MORPHINE SULFATE 4 MG: 4 INJECTION, SOLUTION INTRAMUSCULAR; INTRAVENOUS at 20:44

## 2023-09-13 RX ADMIN — SODIUM BICARBONATE: 84 INJECTION INTRAVENOUS at 12:05

## 2023-09-13 RX ADMIN — METRONIDAZOLE 500 MG: 500 INJECTION, SOLUTION INTRAVENOUS at 16:49

## 2023-09-13 RX ADMIN — POTASSIUM CHLORIDE AND SODIUM CHLORIDE: 450; 150 INJECTION, SOLUTION INTRAVENOUS at 01:55

## 2023-09-13 RX ADMIN — METRONIDAZOLE 500 MG: 500 INJECTION, SOLUTION INTRAVENOUS at 01:57

## 2023-09-13 RX ADMIN — WATER 1000 MG: 1 INJECTION INTRAMUSCULAR; INTRAVENOUS; SUBCUTANEOUS at 01:53

## 2023-09-13 RX ADMIN — INSULIN LISPRO 4 UNITS: 100 INJECTION, SOLUTION INTRAVENOUS; SUBCUTANEOUS at 20:44

## 2023-09-13 RX ADMIN — METOPROLOL TARTRATE 5 MG: 1 INJECTION, SOLUTION INTRAVENOUS at 16:32

## 2023-09-13 RX ADMIN — MORPHINE SULFATE 4 MG: 4 INJECTION, SOLUTION INTRAMUSCULAR; INTRAVENOUS at 09:23

## 2023-09-13 RX ADMIN — METRONIDAZOLE 500 MG: 500 INJECTION, SOLUTION INTRAVENOUS at 09:19

## 2023-09-13 RX ADMIN — ASPIRIN 300 MG: 300 SUPPOSITORY RECTAL at 09:23

## 2023-09-13 RX ADMIN — ENOXAPARIN SODIUM 30 MG: 100 INJECTION SUBCUTANEOUS at 09:12

## 2023-09-13 RX ADMIN — MORPHINE SULFATE 4 MG: 4 INJECTION, SOLUTION INTRAMUSCULAR; INTRAVENOUS at 05:09

## 2023-09-13 ASSESSMENT — PAIN SCALES - GENERAL
PAINLEVEL_OUTOF10: 0
PAINLEVEL_OUTOF10: 4
PAINLEVEL_OUTOF10: 2
PAINLEVEL_OUTOF10: 4
PAINLEVEL_OUTOF10: 4
PAINLEVEL_OUTOF10: 2
PAINLEVEL_OUTOF10: 10

## 2023-09-13 ASSESSMENT — PAIN - FUNCTIONAL ASSESSMENT
PAIN_FUNCTIONAL_ASSESSMENT: ACTIVITIES ARE NOT PREVENTED
PAIN_FUNCTIONAL_ASSESSMENT: ACTIVITIES ARE NOT PREVENTED

## 2023-09-13 ASSESSMENT — PAIN DESCRIPTION - LOCATION: LOCATION: GENERALIZED

## 2023-09-13 ASSESSMENT — PAIN DESCRIPTION - DESCRIPTORS: DESCRIPTORS: DISCOMFORT

## 2023-09-13 NOTE — PROGRESS NOTES
INPATIENT CARDIOLOGY FOLLOW-UP    Name: Marisa Robles    Age: 80 y.o. Date of Admission: 9/9/2023  8:22 PM    Date of Service: 9/13/2023    Primary Cardiologist: New to me    Chief Complaint: Follow-up for Atrial fibrillation with rvr    Interim History:  No new overnight cardiac complaints. Currently with no complaints of CP, SOB, palpitations, dizziness, or lightheadedness. Atrial fibrillation with rates ranging from 120 to 140 bpm.  Patient is currently not taking any medications by mouth.     Review of Systems:   Negative except as described above    Problem List:  Patient Active Problem List   Diagnosis    AMS (altered mental status)    Palliative care encounter    Goals of care, counseling/discussion    PAF (paroxysmal atrial fibrillation) (HCC)    Colitis    Primary hypertension    Other hyperlipidemia       Current Medications:    Current Facility-Administered Medications:     morphine sulfate (PF) injection 4 mg, 4 mg, IntraVENous, Q4H PRN, Neha Shall, DO, 4 mg at 09/13/23 2369    hydrALAZINE (APRESOLINE) tablet 25 mg, 25 mg, Oral, Q6H PRN, Vielka Titus MD    sodium bicarbonate 75 mEq in dextrose 5 % 1,000 mL infusion, , IntraVENous, Continuous, Farzaneh Borrego MD, Last Rate: 100 mL/hr at 09/13/23 1205, New Bag at 09/13/23 1205    insulin glargine (LANTUS) injection vial 10 Units, 10 Units, SubCUTAneous, Daily, Vielka Titus MD    aspirin EC tablet 81 mg, 81 mg, Oral, Daily **OR** aspirin suppository 300 mg, 300 mg, Rectal, Daily, Vielka Titus MD, 300 mg at 09/13/23 0923    amLODIPine (NORVASC) tablet 10 mg, 10 mg, Oral, Daily, Kari Medley MD, 10 mg at 09/11/23 0938    cefTRIAXone (ROCEPHIN) 1,000 mg in sterile water 10 mL IV syringe, 1,000 mg, IntraVENous, Q24H, Neha Singh, DO, 1,000 mg at 09/13/23 0153    metronidazole (FLAGYL) 500 mg in 0.9% NaCl 100 mL IVPB premix, 500 mg, IntraVENous, Q8H, Neha Singh, DO, Stopped at 09/13/23 1025    sodium chloride flush 0.9 % Hypertension: labile blood pressures since admission      4. Diabetes mellitus     5. Hypothyroidism: on replacement therapy with normal TSH 8/28/2p023     6. Acute on chronic anemia: iron deficiency  with + FOBT      7. Altered mentation: likely multifactorial: infection, electrolyte imbalance (hypernatremia, hypokalemia)     8. Renal insufficiency      9. Fever with colitis and UTI         Plan:      Transition from Aspirin to Eliquis once cleared from neurology standpoint. 2.    Monitor blood pressure closely: will give parameters for antihypertensives     3. She will eventually require iron infusions once stable from acute illness     4. Rest as per primary service and other consultants. 5.    The patient is currently DNR CCA with an overall poor quality of life with recent CVA and residual deficits. She will not be a reasonable candidate for rhythm control strategy. We will continue monitoring her and treat her conservatively. 6.  Currently on account of encephalopathy not able to take p.o. meds. If heart rate control is desired, can schedule IV metoprolol 5 mg every 6 for heart rates persistently greater than 130. Tara Lam MD, [facility] [facility] Card[facility]    NOTE: This report was transcribed using voice recognition software. Every effort was made to ensure accuracy; however, inadvertent computerized transcription errors may be present.

## 2023-09-13 NOTE — PROGRESS NOTES
Hospitalist Progress Note      SYNOPSIS:  80 y.o. 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 Patient was more altered than usual. Patient was recently admitted on August 28 for CVA. Brain MRI confirmed acute/early subacute infarcts in the left frontal lobe UDAY territory. She was discharged to SNF on August 31 on Aspirin, Plavix, Lipitor and with Zio monitor. In this admission;  Vital signs within normal limits and stable. The patient is afebrile. Laboratory studies Na-144,  BUN 47, creatinine 1.4, glucose 252, troponin 56 with repeat of 55 and hemoglobin 11.3. Urinalysis shows trace leukocyte Estrace, 2+ bacteria. CT abdomen pelvis showed features suspicious for colitis. Patient was given ceftriaxone and metronidazole in the emergency department and medicine was consulted for admission. CT abdomen also revealed marked distension of the urinary bladder, appearing to cause fullness of  the bilateral pelvocaliceal systems and ureters. Urology was consulted for the recommendations. Urinary catheter was placed, IV antibiotic continued, general surgery consulted for proctocolitis. 9/12-Overnight patient hypernatremia did not improve despite hydration with 1/2 NS. Patient has high anion gap metabolic acidosis. Ordered lactic acid level. Blood sugar 200s. Overnight patient had RRT for tachycardia; patient was in atrial fibrillation. Cardiology was consulted. Given recent h/o CVA, patient had been on aspirin . Planning to  transition aspirin to eliquis once neurology clears. Neurology has been consulted    9/13-Patient is still altered. Hypernatremia and acidosis has improved. Updated patient's son. He states that the patient was not opening eyes or following commands in the nursing home either, Talked to son regarding the guarded prognosis.  He wants to know

## 2023-09-13 NOTE — PROGRESS NOTES
Hospice consult noted. Agency choices reviewed with son Renee Hendrix. He chooses [facility]. Referral made to [facility], [facility] per family choice.

## 2023-09-13 NOTE — CARE COORDINATION
SOCIAL WORK/CASEMANAGEMENT TRANSITION OF CARE PLANNINGGorman Claude GRASSE, [address] [facility]):  pt is from [facility] and will return on discharge  precert is needed. cardio, renal are following. On iv flagyl and rocephin. Fuentes in place. On 2l o2 nc. Neurology to see. Poor intake.  NETO Hollingsworth  9/13/2023

## 2023-09-13 NOTE — PROGRESS NOTES
OT SESSION ATTEMPT     Date:2023  Patient Name: Adelso Oden  MRN: [id number]  : 1934  [facility]     Attempted OT session this date:    [] unavailable due to other medical staff currently with pt   [] on hold, await MRI/ neurosurgical recommendations. [] on hold per nursing staff secondary to lab / radiology results    [] Pt declined Occupational Therapy  this date. Benefits of participation in therapy reviewed with pt.    [] off unit   [x] Other: HOLD per RN    Will reattempt OT eval at a later time.     [address] [facility], [place] [address]

## 2023-09-13 NOTE — PROGRESS NOTES
HOSPICE [facility]    Consult received. Chart reviewed. Patient heart rate is high and respirations are high. No family present. Call made to son Angeli Limon. The hospice benefit and philosophy were explained including that hospice is end of life care in which, per Medicare, a patient has a terminal diagnosis that life expectancy would be 6 months or less. Explained that once in hospice care, all aggressive treatments would be stopped and allow nature to takes its course with focus on comfort care for the patient. Explained hospice services at home, at [facility] with room/board private pay unless patient has Medicaid and the [facility] for short-term symptom management and respite. Angeli Lmion would like to decide on hospice services tomorrow. He needs more time to decide. He would like for her to be comfortable. Discussed code status but not willing to change it to a DNR CC at this time. HOTV will follow up tomorrow. Charge nurse and bedside nurse updated.      Electronically signed by Aleksandar Masterson RN on 9/13/2023 at 4:07 PM  [phone]; [phone]

## 2023-09-13 NOTE — PROGRESS NOTES
Palliative Care Department  [phone]  Palliative Care Progress Note  Provider Roxane Arnol, APRN - CNP    Chris Bello  [id number]  Hospital Day: 5  Date of Initial Consult: 9/12/2023  Referring Provider: Parveen Díaz MD  Palliative Medicine was consulted for assistance with: Goals of care, code status discussion and family support    HPI:   Chris Bello is a 80 y.o. with a medical history of DM, CKDand HTN who was admitted on 9/9/2023 from SNF with a CHIEF COMPLAINT of AMS. Nursing facility reports patient having a poor appetite. Laboratory studies demonstrate BUN 47, creatinine 1.4, glucose 252, troponin 56 with repeat of 55 and hemoglobin 11.3. Urinalysis shows trace leukocyte Estrace, 2+ bacteria. CT abdomen pelvis shows features suspicious for colitis. Patient was admitted for further medical management. 9/11 RRT called for HR >130. Palliative medicine was consulted for goals of care, code status discussion and family support. ASSESSMENT/PLAN:     Pertinent Hospital Diagnoses     Sepsis  CKD    Palliative Care Encounter / Counseling Regarding Goals of Care  Please see detailed goals of care discussion as below  At this time, Chris Bello, Does Not have capacity for medical decision-making.   Capacity is time limited and situation/question specific  During encounter Betty Angelika was surrogate medical decision-maker  Outcome of goals of care meeting:   Change code status to [facility]  Consult hospice  Code status DNR-CCA  Advanced Directives: no POA or living will in epic  Surrogate/Legal NOK:  Kadi Garcia (child) [phone]  Laurie Nettles (grandchild) [phone]    Spiritual assessment: no spiritual distress identified  Bereavement and grief: to be determined  Referrals to: [facility]  SUBJECTIVE:     Current medical issues leading to Palliative Medicine involvement include   Active Hospital Problems    Diagnosis Date Noted    Palliative care encounter [Z51.5] 09/12/2023    Goals of care, counseling/discussion

## 2023-09-13 NOTE — PROGRESS NOTES
Date: 2023       Patient Name: Junior Ivey  : 1934      MRN: [id number]    PT order received and chart reviewed. PT held per RN.      Susi Bran PT, DPT  [id number]

## 2023-09-14 VITALS
HEIGHT: 60 IN | RESPIRATION RATE: 16 BRPM | TEMPERATURE: 98 F | OXYGEN SATURATION: 95 % | SYSTOLIC BLOOD PRESSURE: 116 MMHG | HEART RATE: 150 BPM | BODY MASS INDEX: 25.52 KG/M2 | WEIGHT: 130 LBS | DIASTOLIC BLOOD PRESSURE: 47 MMHG

## 2023-09-14 LAB
ANION GAP SERPL CALCULATED.3IONS-SCNC: 13 MMOL/L (ref 7–16)
BASOPHILS # BLD: 0 K/UL (ref 0–0.2)
BASOPHILS NFR BLD: 0 % (ref 0–2)
BUN SERPL-MCNC: 48 MG/DL (ref 6–23)
CALCIUM SERPL-MCNC: 7.7 MG/DL (ref 8.6–10.2)
CHLORIDE SERPL-SCNC: 112 MMOL/L (ref 98–107)
CO2 SERPL-SCNC: 16 MMOL/L (ref 22–29)
CREAT SERPL-MCNC: 2.1 MG/DL (ref 0.5–1)
EOSINOPHIL # BLD: 0 K/UL (ref 0.05–0.5)
EOSINOPHILS RELATIVE PERCENT: 0 % (ref 0–6)
ERYTHROCYTE [DISTWIDTH] IN BLOOD BY AUTOMATED COUNT: 13.8 % (ref 11.5–15)
GFR SERPL CREATININE-BSD FRML MDRD: 22 ML/MIN/1.73M2
GLUCOSE BLD-MCNC: 375 MG/DL (ref 74–99)
GLUCOSE BLD-MCNC: 425 MG/DL (ref 74–99)
GLUCOSE SERPL-MCNC: 388 MG/DL (ref 74–99)
HCT VFR BLD AUTO: 31.8 % (ref 34–48)
HGB BLD-MCNC: 9.7 G/DL (ref 11.5–15.5)
LYMPHOCYTES NFR BLD: 0.56 K/UL (ref 1.5–4)
LYMPHOCYTES RELATIVE PERCENT: 6 % (ref 20–42)
MAGNESIUM SERPL-MCNC: 1.8 MG/DL (ref 1.6–2.6)
MCH RBC QN AUTO: 31.9 PG (ref 26–35)
MCHC RBC AUTO-ENTMCNC: 30.5 G/DL (ref 32–34.5)
MCV RBC AUTO: 104.6 FL (ref 80–99.9)
MONOCYTES NFR BLD: 0.19 K/UL (ref 0.1–0.95)
MONOCYTES NFR BLD: 2 % (ref 2–12)
NEUTROPHILS NFR BLD: 92 % (ref 43–80)
NEUTS SEG NFR BLD: 8.56 K/UL (ref 1.8–7.3)
PHOSPHATE SERPL-MCNC: 3.4 MG/DL (ref 2.5–4.5)
PLATELET # BLD AUTO: 150 K/UL (ref 130–450)
PMV BLD AUTO: 11.7 FL (ref 7–12)
POTASSIUM SERPL-SCNC: 4.8 MMOL/L (ref 3.5–5)
RBC # BLD AUTO: 3.04 M/UL (ref 3.5–5.5)
RBC # BLD: ABNORMAL 10*6/UL
RBC # BLD: ABNORMAL 10*6/UL
SODIUM SERPL-SCNC: 141 MMOL/L (ref 132–146)
WBC OTHER # BLD: 9.3 K/UL (ref 4.5–11.5)

## 2023-09-14 PROCEDURE — 99232 SBSQ HOSP IP/OBS MODERATE 35: CPT | Performed by: INTERNAL MEDICINE

## 2023-09-14 PROCEDURE — 6360000002 HC RX W HCPCS: Performed by: FAMILY MEDICINE

## 2023-09-14 PROCEDURE — 2500000003 HC RX 250 WO HCPCS: Performed by: INTERNAL MEDICINE

## 2023-09-14 PROCEDURE — 6370000000 HC RX 637 (ALT 250 FOR IP): Performed by: STUDENT IN AN ORGANIZED HEALTH CARE EDUCATION/TRAINING PROGRAM

## 2023-09-14 PROCEDURE — 80048 BASIC METABOLIC PNL TOTAL CA: CPT

## 2023-09-14 PROCEDURE — 82962 GLUCOSE BLOOD TEST: CPT

## 2023-09-14 PROCEDURE — 36415 COLL VENOUS BLD VENIPUNCTURE: CPT

## 2023-09-14 PROCEDURE — 84100 ASSAY OF PHOSPHORUS: CPT

## 2023-09-14 PROCEDURE — 99232 SBSQ HOSP IP/OBS MODERATE 35: CPT | Performed by: CLINICAL NURSE SPECIALIST

## 2023-09-14 PROCEDURE — 2700000000 HC OXYGEN THERAPY PER DAY

## 2023-09-14 PROCEDURE — 83735 ASSAY OF MAGNESIUM: CPT

## 2023-09-14 PROCEDURE — 2580000003 HC RX 258: Performed by: FAMILY MEDICINE

## 2023-09-14 PROCEDURE — 2500000003 HC RX 250 WO HCPCS: Performed by: STUDENT IN AN ORGANIZED HEALTH CARE EDUCATION/TRAINING PROGRAM

## 2023-09-14 PROCEDURE — 85025 COMPLETE CBC W/AUTO DIFF WBC: CPT

## 2023-09-14 RX ORDER — GLYCOPYRROLATE 0.2 MG/ML
0.1 INJECTION INTRAMUSCULAR; INTRAVENOUS EVERY 4 HOURS PRN
Status: DISCONTINUED | OUTPATIENT
Start: 2023-09-14 | End: 2023-09-14 | Stop reason: HOSPADM

## 2023-09-14 RX ORDER — SODIUM CHLORIDE 450 MG/100ML
INJECTION, SOLUTION INTRAVENOUS CONTINUOUS
Status: DISCONTINUED | OUTPATIENT
Start: 2023-09-14 | End: 2023-09-14 | Stop reason: HOSPADM

## 2023-09-14 RX ORDER — MORPHINE SULFATE 4 MG/ML
2 INJECTION, SOLUTION INTRAMUSCULAR; INTRAVENOUS EVERY 4 HOURS PRN
Qty: 4 ML | Refills: 0 | Status: SHIPPED | OUTPATIENT
Start: 2023-09-14 | End: 2023-09-17

## 2023-09-14 RX ADMIN — GLYCOPYRROLATE 0.1 MG: 0.2 INJECTION INTRAMUSCULAR; INTRAVENOUS at 17:49

## 2023-09-14 RX ADMIN — METOPROLOL TARTRATE 5 MG: 1 INJECTION, SOLUTION INTRAVENOUS at 17:49

## 2023-09-14 RX ADMIN — INSULIN LISPRO 8 UNITS: 100 INJECTION, SOLUTION INTRAVENOUS; SUBCUTANEOUS at 17:37

## 2023-09-14 RX ADMIN — Medication 10 ML: at 14:23

## 2023-09-14 RX ADMIN — WATER 1000 MG: 1 INJECTION INTRAMUSCULAR; INTRAVENOUS; SUBCUTANEOUS at 00:23

## 2023-09-14 RX ADMIN — MORPHINE SULFATE 4 MG: 4 INJECTION, SOLUTION INTRAMUSCULAR; INTRAVENOUS at 00:20

## 2023-09-14 RX ADMIN — METRONIDAZOLE 500 MG: 500 INJECTION, SOLUTION INTRAVENOUS at 00:24

## 2023-09-14 RX ADMIN — MORPHINE SULFATE 4 MG: 4 INJECTION, SOLUTION INTRAMUSCULAR; INTRAVENOUS at 05:31

## 2023-09-14 RX ADMIN — MORPHINE SULFATE 4 MG: 4 INJECTION, SOLUTION INTRAMUSCULAR; INTRAVENOUS at 11:54

## 2023-09-14 ASSESSMENT — PAIN SCALES - GENERAL
PAINLEVEL_OUTOF10: 4
PAINLEVEL_OUTOF10: 2
PAINLEVEL_OUTOF10: 4
PAINLEVEL_OUTOF10: 2
PAINLEVEL_OUTOF10: 2
PAINLEVEL_OUTOF10: 4

## 2023-09-14 ASSESSMENT — PAIN SCALES - WONG BAKER
WONGBAKER_NUMERICALRESPONSE: 0
WONGBAKER_NUMERICALRESPONSE: 0

## 2023-09-14 ASSESSMENT — PAIN DESCRIPTION - LOCATION
LOCATION: GENERALIZED
LOCATION: GENERALIZED

## 2023-09-14 NOTE — CARE COORDINATION
SOCIAL WORK/CASEMANAGEMENT TRANSITION OF CARE PLANNINGMumtaz PEACOCK, [facility]):    pt is from [facility] and will return on discharge and if returns then  precert is needed. PT and OT have been held. HOV to talk with son today about plan. Pt is now a dnr-cca. On 4l nc , bs of 375 this a.m. pallative following as is renal , neurology ,and  general surgery.  NETO Edge  9/14/2023

## 2023-09-14 NOTE — PROGRESS NOTES
Palliative Care Department  [phone]  Palliative Care Progress Note  Provider Mesfin Finn, APRN - [facility]  [id number]  Hospital Day: 6  Date of Initial Consult: 9/12/2023  Referring Provider: Juana Martel MD  Palliative Medicine was consulted for assistance with: Goals of care, code status discussion and family support    HPI:   Nelda Wilkerson is a 80 y.o. with a medical history of DM, CKDand HTN who was admitted on 9/9/2023 from SNF with a CHIEF COMPLAINT of AMS. Nursing facility reports patient having a poor appetite. Laboratory studies demonstrate BUN 47, creatinine 1.4, glucose 252, troponin 56 with repeat of 55 and hemoglobin 11.3. Urinalysis shows trace leukocyte Estrace, 2+ bacteria. CT abdomen pelvis shows features suspicious for colitis. Patient was admitted for further medical management. 9/11 RRT called for HR >130. Palliative medicine was consulted for goals of care, code status discussion and family support. ASSESSMENT/PLAN:     Pertinent Hospital Diagnoses   Sepsis  CKD  Atrial fibrillation with RVR    Palliative Care Encounter / Counseling Regarding Goals of Care  Please see detailed goals of care discussion as below  At this time, Nelda Wilkerson, Does Not have capacity for medical decision-making.   Capacity is time limited and situation/question specific  During encounter Eduard Garciat was surrogate medical decision-maker  Outcome of goals of care meeting:   Change code status to [facility]  Son now in agreement with hospice care and comfort  Code status [facility]  Advanced Directives: no POA or living will in epic  Surrogate/Legal NOK:  Alejandre Rosalee (child) [phone]  Horajit Nielson (grandchild) [phone]    Spiritual assessment: no spiritual distress identified  Bereavement and grief: to be determined  Referrals to: [facility]  SUBJECTIVE:     Current medical issues leading to Palliative Medicine involvement include   Active Hospital Problems    Diagnosis Date Noted    Palliative care Family    Time/Communication  Greater than 50% of time spent, total 35 minutes in counseling and coordination of care at the bedside regarding goals of care and diagnosis and prognosis. Thank you for allowing Palliative Medicine to participate in the care of [facility].

## 2023-09-14 NOTE — DISCHARGE SUMMARY
Hospitalist Discharge Summary    Patient ID: Orlando Solorzano   Patient : 1934  Patient's PCP: Key Reyes DO    Admit Date: 2023   Admitting Physician: Ryann Culver DO    Discharge Date:  2023   Discharge Physician: Tyler Larios MD   Discharge Condition: Unstable  Discharge Disposition: [facility] course in brief:  (Please refer to daily progress notes for a comprehensive review of the hospitalization by requesting medical records)    80 y.o. 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 Patient was more altered than usual. Patient was recently admitted on  for CVA. Brain MRI confirmed acute/early subacute infarcts in the left frontal lobe UDAY territory. She was discharged to SNF on  on Aspirin, Plavix, Lipitor and with Zio monitor. In this admission;  Vital signs within normal limits and stable. The patient is afebrile. Laboratory studies Na-144,  BUN 47, creatinine 1.4, glucose 252, troponin 56 with repeat of 55 and hemoglobin 11.3. Urinalysis shows trace leukocyte Estrace, 2+ bacteria. CT abdomen pelvis showed features suspicious for colitis. Patient was given ceftriaxone and metronidazole in the emergency department and medicine was consulted for admission. CT abdomen also revealed marked distension of the urinary bladder, appearing to cause fullness of  the bilateral pelvocaliceal systems and ureters. Urology was consulted for the recommendations. Urinary catheter was placed, IV antibiotic continued, general surgery consulted for proctocolitis. -Overnight patient hypernatremia did not improve despite hydration with 1/2 NS. Patient has high anion gap metabolic acidosis. Ordered lactic acid level. Blood sugar 200s.    Overnight patient had RRT for tachycardia; patient was in atrial CONTINUE taking these medications      amLODIPine 5 MG tablet  Commonly known as: NORVASC     aspirin 81 MG EC tablet     atorvastatin 40 MG tablet  Commonly known as: LIPITOR  Take 1 tablet by mouth nightly     hydrALAZINE 25 MG tablet  Commonly known as: APRESOLINE  Take 1 tablet by mouth every 8 hours     insulin lispro 100 UNIT/ML injection vial  Commonly known as: HUMALOG     Lantus SoloStar 100 UNIT/ML injection pen  Generic drug: insulin glargine     levothyroxine 25 MCG tablet  Commonly known as: SYNTHROID     Plavix 75 MG tablet  Generic drug: clopidogrel     pregabalin 25 MG capsule  Commonly known as: LYRICA  Take 1 capsule by mouth 3 times daily for 30 days. Max Daily Amount: 75 mg               Where to Get Your Medications        These medications were sent to [address] [place] [address] [phone]  [address] [place] [id number]      Phone: [phone]   morphine sulfate (PF) 4 MG/ML injection         Time Spent on discharge is more than 45 minutes in the examination, evaluation, counseling and review of medications and discharge plan.    +++++++++++++++++++++++++++++++++++++++++++++++++  Matilda Franklin MD  [address] [place] [address]  +++++++++++++++++++++++++++++++++++++++++++++++++  NOTE: This report was transcribed using voice recognition software. Every effort was made to ensure accuracy; however, inadvertent computerized transcription errors may be present.

## 2023-09-14 NOTE — PROGRESS NOTES
[facility]    Met patient at bedside. No family members present. Updates received from bedside nurse. Call placed to patient's son Misael Motta. Misael Motta declines making a Hospice decision today. Misael Motta expressed concerns about cost options for patient at discharge if he elected Hospice services at [facility]. Misael Motta is not open to [facility] due to is is only a temporary solution if patient were accepted. Misael Motta stated patient will \"fight this\". Misael Motta stated \"patient may still get better\". Emotional support provided. Misael Motta stated he will be in to see patient today and will ask to speak with [facility] nurse if he wants to after he learns more from providers. Misael Motta asks to speak with CM/SW about cost options for patient to return to [facility] without Hospice services. [facility] will continue to follow. [address] Patient's son Misael Motta arrived. Call from bedside nursing that he would like to speak with [facility]OT[facility] nurse. Misael Motta stated after seeing patient today her general condition has declined. Misael Motta stated he agrees to Hospice services for patient. Misael Motta is hopeful patient is accepted at [facility] for GIP level of care. Hospice nurse educated about code status of DNR-CCA and  [facility]. Misael Motta stated \"it was always her wish for no heroics\". Misael Motta agrees that he wishes for comfort measures only for the patient. [facility]OT[facility] nurse updated bedside nursing. Call placed to [facility] to review patient information. 1521 - Patient is accepted at [facility]. Patient's son Misael Motta signed Hospice consents. Hospice arranged transport with [facility] for transfer today.  at 700 pm . [facility]OT[facility] request to leave IV access, yates, and oxygen in place for transport. Transport packet to be placed in soft chart. Updates provided to CM and nursing. [facility] to continue to follow. [id number] - Nurse to nurse report called to [facility].      Electronically signed by Thelma Grimaldo RN on 9/14/2023 at 9:34 AM  [phone]: [phone]

## 2023-09-14 NOTE — PLAN OF CARE
Problem: Chronic Conditions and Co-morbidities  Goal: Patient's chronic conditions and co-morbidity symptoms are monitored and maintained or improved  Outcome: Progressing     Problem: Discharge Planning  Goal: Discharge to home or other facility with appropriate resources  Outcome: Progressing  Flowsheets (Taken 9/13/2023 2009)  Discharge to home or other facility with appropriate resources:   Identify barriers to discharge with patient and caregiver   Arrange for needed discharge resources and transportation as appropriate   Identify discharge learning needs (meds, wound care, etc)     Problem: Safety - Adult  Goal: Free from fall injury  Outcome: Progressing

## 2023-09-14 NOTE — PROGRESS NOTES
Occupational Therapy  OT SESSION ATTEMPT     Date:2023  Patient Name: Alvina Chakraborty  MRN: [id number]  : 1934  Room: [facility]-A     Attempted OT session this date:    [] unavailable due to other medical staff currently with pt   [] on hold due to current medical status    [] on hold per nursing staff secondary to lab / radiology results    [] declined Occupational Therapy  this date due to ___. Benefits of participation in therapy reviewed with pt.    [] off unit   [] Other:     Will reattempt OT evaluation at a later time.     Evangelista Phillip OTR/L #[id number]

## 2023-09-14 NOTE — PROGRESS NOTES
Physical Therapy    PT order received and medical chart reviewed 9/14. Holding PT evaluation due to current medical status. Thank you.     Gurpreet Horner, PT, DPT  [id number]

## 2023-09-14 NOTE — PROGRESS NOTES
INPATIENT CARDIOLOGY FOLLOW-UP    Name: Uri Dumont    Age: 80 y.o. Date of Admission: 9/9/2023  8:22 PM    Date of Service: 9/14/2023    Primary Cardiologist: New to me    Chief Complaint: Follow-up for Atrial fibrillation with rvr    Interim History:  No new overnight cardiac complaints. Currently with no complaints of CP, SOB, palpitations, dizziness, or lightheadedness. Atrial fibrillation with rates ranging from 120 to 140 bpm.  Patient is currently not taking any medications by mouth.     Review of Systems:   Negative except as described above    Problem List:  Patient Active Problem List   Diagnosis    AMS (altered mental status)    Palliative care encounter    Goals of care, counseling/discussion    PAF (paroxysmal atrial fibrillation) (HCC)    Colitis    Primary hypertension    Other hyperlipidemia       Current Medications:    Current Facility-Administered Medications:     morphine sulfate (PF) injection 4 mg, 4 mg, IntraVENous, Q4H PRN, Bianka Rojasve, DO, 4 mg at 09/14/23 0531    hydrALAZINE (APRESOLINE) tablet 25 mg, 25 mg, Oral, Q6H PRN, Leonardo Benson MD    sodium bicarbonate 75 mEq in dextrose 5 % 1,000 mL infusion, , IntraVENous, Continuous, Farzaneh Borrego MD, Last Rate: 100 mL/hr at 09/13/23 1205, New Bag at 09/13/23 1205    metoprolol (LOPRESSOR) injection 5 mg, 5 mg, IntraVENous, Q6H, Yuri Damico MD, 5 mg at 09/13/23 1632    insulin glargine (LANTUS) injection vial 10 Units, 10 Units, SubCUTAneous, Daily, Leonardo Benson MD    aspirin EC tablet 81 mg, 81 mg, Oral, Daily **OR** aspirin suppository 300 mg, 300 mg, Rectal, Daily, Leonardo Benson MD, 300 mg at 09/13/23 0923    amLODIPine (NORVASC) tablet 10 mg, 10 mg, Oral, Daily, Sandi Triplett MD, 10 mg at 09/11/23 0938    cefTRIAXone (ROCEPHIN) 1,000 mg in sterile water 10 mL IV syringe, 1,000 mg, IntraVENous, Q24H, Bianka Rojasve, DO, 1,000 mg at 09/14/23 0023    metronidazole (FLAGYL) 500 mg in 0.9% NaCl 100 mL IVPB premix, 500

## 2023-09-14 NOTE — PROGRESS NOTES
Physician Progress Note      Eber Lopes  CSN #:                  [id number]  :                       1934  ADMIT DATE:       2023 8:22 PM  [address] [facility] DATE:  RESPONDING  PROVIDER #:        Ernestina Salgado MD          QUERY TEXT:    Pt admitted with altered mental status and likely colitis and UTI. Noted   documentation of sepsis on in RRT event note and  by urology   consultant. If possible, please document in progress notes and discharge   summary:    The medical record reflects the following:  Risk Factors: Colitis, urinary retention  Clinical Indicators: RRT  Sepsis suspect UTI- febrile UA ordered SVT   suspect due to sepsis; Urology consult sepsis, UTI - yates was inserted with   immediate return 1600cc output; Lab findings on admission WBC 10.2--8.0 lactic   1.2--1.6 VS on admission 97.2, 84, 16, 101/75 92% 4LNC; VS on  101.5,   132, 30, 150/82 93% 2LNC, 103.2, 144, 32, 144/67  Treatment: Urology consult, Rocephin, Flagyl    Thank you  ELIAZAR HowardN, RN, CCDS  Clinical Documentation Improvement  Options provided:  -- Sepsis confirmed present on admission and evidenced by, please provide   further support. -- Sepsis confirmed not present on admission  -- Sepsis ruled out  -- Other - I will add my own diagnosis  -- Disagree - Not applicable / Not valid  -- Disagree - Clinically unable to determine / Unknown  -- Refer to Clinical Documentation Reviewer    PROVIDER RESPONSE TEXT:    The diagnosis of sepsis was confirmed not present on admission.     Query created by: Mikael Clark on  27:90 AM      Electronically signed by:  Ernestina Salgado MD 2023 11:42 AM